# Patient Record
Sex: MALE | Race: WHITE | NOT HISPANIC OR LATINO | Employment: FULL TIME | URBAN - METROPOLITAN AREA
[De-identification: names, ages, dates, MRNs, and addresses within clinical notes are randomized per-mention and may not be internally consistent; named-entity substitution may affect disease eponyms.]

---

## 2017-02-22 ENCOUNTER — RESULTS ENCOUNTER (OUTPATIENT)
Dept: INTERNAL MEDICINE | Facility: CLINIC | Age: 52
End: 2017-02-22

## 2017-02-22 DIAGNOSIS — E78.2 ELEVATED CHOLESTEROL WITH ELEVATED TRIGLYCERIDES: ICD-10-CM

## 2018-04-06 ENCOUNTER — OFFICE VISIT (OUTPATIENT)
Dept: INTERNAL MEDICINE | Facility: CLINIC | Age: 53
End: 2018-04-06

## 2018-04-06 ENCOUNTER — HOSPITAL ENCOUNTER (OUTPATIENT)
Dept: GENERAL RADIOLOGY | Facility: HOSPITAL | Age: 53
Discharge: HOME | End: 2018-04-06
Admitting: FAMILY MEDICINE

## 2018-04-06 VITALS
DIASTOLIC BLOOD PRESSURE: 80 MMHG | TEMPERATURE: 98.3 F | SYSTOLIC BLOOD PRESSURE: 128 MMHG | HEART RATE: 66 BPM | BODY MASS INDEX: 32.7 KG/M2 | WEIGHT: 241.4 LBS | OXYGEN SATURATION: 94 % | HEIGHT: 72 IN

## 2018-04-06 DIAGNOSIS — F34.1 DYSTHYMIA: Primary | ICD-10-CM

## 2018-04-06 DIAGNOSIS — M54.2 NECK PAIN: ICD-10-CM

## 2018-04-06 DIAGNOSIS — Z23 NEED FOR TDAP VACCINATION: ICD-10-CM

## 2018-04-06 DIAGNOSIS — E66.3 OVERWEIGHT: ICD-10-CM

## 2018-04-06 LAB
CHOLEST SERPL-MCNC: 248 MG/DL (ref 0–200)
HDLC SERPL-MCNC: 42 MG/DL (ref 40–60)
LDLC SERPL CALC-MCNC: 169 MG/DL (ref 0–100)
T4 FREE SERPL-MCNC: 0.88 NG/DL (ref 0.93–1.7)
TRIGL SERPL-MCNC: 187 MG/DL (ref 0–150)
TSH SERPL DL<=0.005 MIU/L-ACNC: 2.45 MIU/ML (ref 0.27–4.2)
VLDLC SERPL CALC-MCNC: 37.4 MG/DL (ref 5–40)

## 2018-04-06 PROCEDURE — 90471 IMMUNIZATION ADMIN: CPT | Performed by: FAMILY MEDICINE

## 2018-04-06 PROCEDURE — 99214 OFFICE O/P EST MOD 30 MIN: CPT | Performed by: FAMILY MEDICINE

## 2018-04-06 PROCEDURE — 72050 X-RAY EXAM NECK SPINE 4/5VWS: CPT

## 2018-04-06 PROCEDURE — 90715 TDAP VACCINE 7 YRS/> IM: CPT | Performed by: FAMILY MEDICINE

## 2018-04-06 RX ORDER — CYCLOBENZAPRINE HCL 10 MG
10 TABLET ORAL 3 TIMES DAILY PRN
Qty: 30 TABLET | Refills: 0 | Status: SHIPPED | OUTPATIENT
Start: 2018-04-06 | End: 2018-08-14

## 2018-04-06 RX ORDER — MELOXICAM 7.5 MG/1
7.5 TABLET ORAL DAILY
Qty: 30 TABLET | Refills: 0 | Status: SHIPPED | OUTPATIENT
Start: 2018-04-06 | End: 2018-08-14

## 2018-04-06 NOTE — PROGRESS NOTES
Sanjeev Stubbs is a 52 y.o. male.      Assessment/Plan   Problem List Items Addressed This Visit        Nervous and Auditory    Neck pain    Relevant Orders    XR Spine Cervical Complete 4 or 5 View (Completed)       Other    Dysthymia - Primary    Relevant Orders    TSH (Completed)    T4, Free (Completed)    Lipid Panel (Completed)    Need for Tdap vaccination    Overweight      Other Visit Diagnoses    None.          Anti-inflammatory and muscle relaxant for neck pain follow-up results of C-spine film if symptoms persist consult physical therapy continue present management of depression with sertraline and Wellbutrin he has not followed by psychiatrist anymore at this point he is stable  Continue same      Chief Complaint   Patient presents with   • follow up to depression   • concern with weight   • pain in right side of neck     Social History   Substance Use Topics   • Smoking status: Never Smoker   • Smokeless tobacco: Never Used   • Alcohol use Yes      Comment: socially       History of Present Illness   Comes in because of follow-up for depression which is stable in some weight gain that he has developed without any specific etiology some decreased physical activity his pain in his right neck has been relatively persistent over the last couple monthsof the injury or traumatic event he feels that there is no radiating quality to the pain in there is no specific improvement with any prevention tried at home this day he is not using ice some intermittent use of anti-inflammatories his medications for depression moles status same we also discussed his preventative services requiring need for 2 to  The following portions of the patient's history were reviewed and updated as appropriate:PMHroutine: Social history , Past Medical History, Allergies, Current Medications, Active Problem List and Health Maintenance    Review of Systems   Constitutional: Negative for appetite change, fever and unexpected weight  "change.   HENT: Negative for ear pain, facial swelling, sore throat and trouble swallowing.    Eyes: Negative for pain and visual disturbance.   Respiratory: Negative for chest tightness, shortness of breath and wheezing.    Cardiovascular: Negative for chest pain and palpitations.   Gastrointestinal: Negative for abdominal pain and blood in stool.   Endocrine: Negative.    Genitourinary: Negative for decreased urine volume, difficulty urinating, frequency, hematuria and urgency.   Musculoskeletal: Positive for neck pain. Negative for joint swelling.   Neurological: Negative for tremors, seizures and syncope.   Hematological: Negative for adenopathy.   Psychiatric/Behavioral: Negative.        Objective   Vitals:    04/06/18 0932   BP: 128/80   BP Location: Left arm   Patient Position: Sitting   Cuff Size: Large Adult   Pulse: 66   Temp: 98.3 °F (36.8 °C)   TempSrc: Oral   SpO2: 94%   Weight: 109 kg (241 lb 6.4 oz)   Height: 182.9 cm (72\")     Body mass index is 32.74 kg/m².  Physical Exam   Constitutional: He is oriented to person, place, and time. He appears well-developed and well-nourished. No distress.   HENT:   Head: Normocephalic and atraumatic.   Eyes: Conjunctivae and EOM are normal. Pupils are equal, round, and reactive to light. Right eye exhibits no discharge. Left eye exhibits no discharge. No scleral icterus.   Neck: Trachea normal and normal range of motion. Neck supple. No hepatojugular reflux present. Muscular tenderness present. Carotid bruit is not present. No no neck rigidity. No tracheal deviation, no edema and no erythema present. No thyroid mass and no thyromegaly present.   Cardiovascular: Normal rate, regular rhythm, normal heart sounds, intact distal pulses and normal pulses.  Exam reveals no gallop.    No murmur heard.  Pulmonary/Chest: Effort normal and breath sounds normal. No respiratory distress. He has no wheezes. He has no rales.   Musculoskeletal: Normal range of motion.        Right " shoulder: Normal.        Right elbow: Normal.       Right wrist: Normal.   Neurological: He is alert and oriented to person, place, and time. He exhibits normal muscle tone. Coordination normal.   Skin: Skin is warm. No rash noted. No erythema. No pallor.   Psychiatric: He has a normal mood and affect. His behavior is normal. Judgment and thought content normal.   Nursing note and vitals reviewed.    Reviewed Data:  Office Visit on 04/06/2018   Component Date Value Ref Range Status   • TSH 04/06/2018 2.450  0.270 - 4.200 mIU/mL Final   • Free T4 04/06/2018 0.88* 0.93 - 1.70 ng/dL Final   • Total Cholesterol 04/06/2018 248* 0 - 200 mg/dL Final   • Triglycerides 04/06/2018 187* 0 - 150 mg/dL Final   • HDL Cholesterol 04/06/2018 42  40 - 60 mg/dL Final   • VLDL Cholesterol 04/06/2018 37.4  5 - 40 mg/dL Final   • LDL Cholesterol  04/06/2018 169* 0 - 100 mg/dL Final

## 2018-04-09 ENCOUNTER — TELEPHONE (OUTPATIENT)
Dept: INTERNAL MEDICINE | Facility: CLINIC | Age: 53
End: 2018-04-09

## 2018-04-09 RX ORDER — ATORVASTATIN CALCIUM 20 MG/1
20 TABLET, FILM COATED ORAL DAILY
Qty: 30 TABLET | Refills: 3 | Status: SHIPPED | OUTPATIENT
Start: 2018-04-09 | End: 2018-11-08 | Stop reason: SDUPTHER

## 2018-04-09 NOTE — TELEPHONE ENCOUNTER
----- Message from Harshil Garcia MD sent at 4/9/2018  7:50 AM EDT -----  Elevated Cholesterol recommend following a low-cholesterol diet start atorvastatin 20 mg daily #30 with 3 refills follow-up appointment 3 months

## 2018-04-10 RX ORDER — SERTRALINE HYDROCHLORIDE 100 MG/1
TABLET, FILM COATED ORAL
Qty: 30 TABLET | Refills: 0 | Status: SHIPPED | OUTPATIENT
Start: 2018-04-10 | End: 2018-06-27 | Stop reason: SDUPTHER

## 2018-04-10 RX ORDER — BUPROPION HYDROCHLORIDE 150 MG/1
TABLET ORAL
Qty: 30 TABLET | Refills: 0 | Status: SHIPPED | OUTPATIENT
Start: 2018-04-10 | End: 2018-06-27 | Stop reason: SDUPTHER

## 2018-04-11 ENCOUNTER — TELEPHONE (OUTPATIENT)
Dept: INTERNAL MEDICINE | Facility: CLINIC | Age: 53
End: 2018-04-11

## 2018-04-11 DIAGNOSIS — M25.78 OSTEOPHYTE OF CERVICAL SPINE: Primary | ICD-10-CM

## 2018-04-11 NOTE — TELEPHONE ENCOUNTER
----- Message from Harshil Garcia MD sent at 4/9/2018  4:37 PM EDT -----  C-spine film reveals some osteophytic changes consult physical therapy for neck pain

## 2018-06-27 RX ORDER — SERTRALINE HYDROCHLORIDE 100 MG/1
TABLET, FILM COATED ORAL
Qty: 30 TABLET | Refills: 0 | Status: SHIPPED | OUTPATIENT
Start: 2018-06-27 | End: 2018-08-14 | Stop reason: SDUPTHER

## 2018-06-27 RX ORDER — BUPROPION HYDROCHLORIDE 150 MG/1
TABLET ORAL
Qty: 30 TABLET | Refills: 0 | Status: SHIPPED | OUTPATIENT
Start: 2018-06-27 | End: 2018-08-14 | Stop reason: SDUPTHER

## 2018-08-06 RX ORDER — SERTRALINE HYDROCHLORIDE 100 MG/1
TABLET, FILM COATED ORAL
Qty: 30 TABLET | Refills: 0 | OUTPATIENT
Start: 2018-08-06

## 2018-08-06 RX ORDER — BUPROPION HYDROCHLORIDE 150 MG/1
TABLET ORAL
Qty: 30 TABLET | Refills: 0 | OUTPATIENT
Start: 2018-08-06

## 2018-08-14 ENCOUNTER — OFFICE VISIT (OUTPATIENT)
Dept: INTERNAL MEDICINE | Facility: CLINIC | Age: 53
End: 2018-08-14

## 2018-08-14 VITALS
WEIGHT: 239.7 LBS | DIASTOLIC BLOOD PRESSURE: 86 MMHG | OXYGEN SATURATION: 98 % | TEMPERATURE: 98.2 F | SYSTOLIC BLOOD PRESSURE: 148 MMHG | HEIGHT: 72 IN | HEART RATE: 75 BPM | BODY MASS INDEX: 32.47 KG/M2

## 2018-08-14 DIAGNOSIS — E66.9 NON MORBID OBESITY: ICD-10-CM

## 2018-08-14 DIAGNOSIS — F34.1 DYSTHYMIA: ICD-10-CM

## 2018-08-14 DIAGNOSIS — I10 ESSENTIAL HYPERTENSION: Primary | ICD-10-CM

## 2018-08-14 DIAGNOSIS — E78.2 MIXED HYPERLIPIDEMIA: ICD-10-CM

## 2018-08-14 PROCEDURE — 99213 OFFICE O/P EST LOW 20 MIN: CPT | Performed by: FAMILY MEDICINE

## 2018-08-14 RX ORDER — SERTRALINE HYDROCHLORIDE 100 MG/1
100 TABLET, FILM COATED ORAL DAILY
Qty: 90 TABLET | Refills: 2 | Status: SHIPPED | OUTPATIENT
Start: 2018-08-14 | End: 2019-09-11 | Stop reason: SDUPTHER

## 2018-08-14 RX ORDER — BUPROPION HYDROCHLORIDE 150 MG/1
150 TABLET ORAL EVERY MORNING
Qty: 90 TABLET | Refills: 2 | Status: SHIPPED | OUTPATIENT
Start: 2018-08-14 | End: 2019-09-11 | Stop reason: SDUPTHER

## 2018-08-14 NOTE — PROGRESS NOTES
"Sanjeev Stubbs is a 52 y.o. male.      Assessment/Plan   Problem List Items Addressed This Visit        Cardiovascular and Mediastinum    Essential hypertension - Primary    Mixed hyperlipidemia    Relevant Orders    Lipid Panel    AST    ALT       Digestive    Non morbid obesity       Other    Dysthymia    Relevant Medications    sertraline (ZOLOFT) 100 MG tablet    buPROPion XL (WELLBUTRIN XL) 150 MG 24 hr tablet             Continue Wellbutrin and sertraline follow-up results of laboratory evaluation benefit of Lipitor otherwise as needed or 9 months.  He'll have lipid panel done within the next couple weeks    Chief Complaint   Patient presents with   • follow up to depression     Social History   Substance Use Topics   • Smoking status: Never Smoker   • Smokeless tobacco: Never Used   • Alcohol use Yes      Comment: socially       History of Present Illness   Follow-up for management of chronic problems of depression doing well Wellbutrin and Zoloft like continue same he is started on atorvastatin for hyperlipidemia proximally 3 months ago and is need of monitoring liver enzymes as well as benefit of lipid reduction does not drink much alcohol he doesn't take much Tylenol is no other acute concerns  The following portions of the patient's history were reviewed and updated as appropriate:PMHroutine: Social history , Past Medical History, Allergies, Current Medications, Active Problem List, Family History and Health Maintenance    Review of Systems   Constitutional: Negative.    Musculoskeletal: Negative.    Neurological: Negative.        Objective   Vitals:    08/14/18 1521   BP: 148/86   BP Location: Left arm   Patient Position: Sitting   Cuff Size: Adult   Pulse: 75   Temp: 98.2 °F (36.8 °C)   TempSrc: Oral   SpO2: 98%   Weight: 109 kg (239 lb 11.2 oz)   Height: 182.9 cm (72\")     Body mass index is 32.51 kg/m².  Physical Exam   Constitutional: He is oriented to person, place, and time. He appears " well-developed and well-nourished. No distress.   HENT:   Head: Normocephalic and atraumatic.   Eyes: Pupils are equal, round, and reactive to light. Conjunctivae and EOM are normal. Right eye exhibits no discharge. Left eye exhibits no discharge. No scleral icterus.   Neck: Normal range of motion. Neck supple. No tracheal deviation present. No thyromegaly present.   Cardiovascular: Normal rate, regular rhythm, normal heart sounds, intact distal pulses and normal pulses.  Exam reveals no gallop.    No murmur heard.  Pulmonary/Chest: Effort normal and breath sounds normal. No respiratory distress. He has no wheezes. He has no rales.   Musculoskeletal: Normal range of motion.   Neurological: He is alert and oriented to person, place, and time. He exhibits normal muscle tone. Coordination normal.   Skin: Skin is warm. No rash noted. No erythema. No pallor.   Psychiatric: He has a normal mood and affect. His behavior is normal. Judgment and thought content normal.   Nursing note and vitals reviewed.    Reviewed Data:  No visits with results within 1 Month(s) from this visit.   Latest known visit with results is:   Office Visit on 04/06/2018   Component Date Value Ref Range Status   • TSH 04/06/2018 2.450  0.270 - 4.200 mIU/mL Final   • Free T4 04/06/2018 0.88* 0.93 - 1.70 ng/dL Final   • Total Cholesterol 04/06/2018 248* 0 - 200 mg/dL Final   • Triglycerides 04/06/2018 187* 0 - 150 mg/dL Final   • HDL Cholesterol 04/06/2018 42  40 - 60 mg/dL Final   • VLDL Cholesterol 04/06/2018 37.4  5 - 40 mg/dL Final   • LDL Cholesterol  04/06/2018 169* 0 - 100 mg/dL Final

## 2018-08-15 ENCOUNTER — RESULTS ENCOUNTER (OUTPATIENT)
Dept: INTERNAL MEDICINE | Facility: CLINIC | Age: 53
End: 2018-08-15

## 2018-08-15 DIAGNOSIS — E78.2 MIXED HYPERLIPIDEMIA: ICD-10-CM

## 2018-10-05 LAB
ALT SERPL-CCNC: 28 U/L (ref 1–41)
AST SERPL-CCNC: 22 U/L (ref 1–40)
CHOLEST SERPL-MCNC: 187 MG/DL (ref 0–200)
HDLC SERPL-MCNC: 51 MG/DL (ref 40–60)
LDLC SERPL CALC-MCNC: 115 MG/DL (ref 0–100)
TRIGL SERPL-MCNC: 106 MG/DL (ref 0–150)
VLDLC SERPL CALC-MCNC: 21.2 MG/DL (ref 5–40)

## 2018-10-08 ENCOUNTER — TELEPHONE (OUTPATIENT)
Dept: INTERNAL MEDICINE | Facility: CLINIC | Age: 53
End: 2018-10-08

## 2018-10-08 NOTE — TELEPHONE ENCOUNTER
----- Message from Harshil Garcia MD sent at 10/8/2018  8:04 AM EDT -----  Labs much improved with Lipitor, T  cholesterol 187  continue Lipitor

## 2018-11-08 RX ORDER — ATORVASTATIN CALCIUM 20 MG/1
20 TABLET, FILM COATED ORAL DAILY
Qty: 30 TABLET | Refills: 3 | Status: SHIPPED | OUTPATIENT
Start: 2018-11-08 | End: 2019-04-24 | Stop reason: SDUPTHER

## 2019-04-24 RX ORDER — ATORVASTATIN CALCIUM 20 MG/1
20 TABLET, FILM COATED ORAL DAILY
Qty: 90 TABLET | Refills: 0 | Status: SHIPPED | OUTPATIENT
Start: 2019-04-24 | End: 2019-09-11 | Stop reason: SDUPTHER

## 2019-06-26 RX ORDER — SERTRALINE HYDROCHLORIDE 100 MG/1
100 TABLET, FILM COATED ORAL DAILY
Qty: 90 TABLET | Refills: 2 | OUTPATIENT
Start: 2019-06-26

## 2019-09-03 RX ORDER — BUPROPION HYDROCHLORIDE 150 MG/1
150 TABLET ORAL EVERY MORNING
Qty: 90 TABLET | Refills: 2 | OUTPATIENT
Start: 2019-09-03

## 2019-09-06 RX ORDER — BUPROPION HYDROCHLORIDE 150 MG/1
150 TABLET ORAL EVERY MORNING
Qty: 90 TABLET | Refills: 2 | OUTPATIENT
Start: 2019-09-06

## 2019-09-06 RX ORDER — SERTRALINE HYDROCHLORIDE 100 MG/1
100 TABLET, FILM COATED ORAL DAILY
Qty: 90 TABLET | Refills: 2 | OUTPATIENT
Start: 2019-09-06

## 2019-09-06 RX ORDER — ATORVASTATIN CALCIUM 20 MG/1
20 TABLET, FILM COATED ORAL DAILY
Qty: 90 TABLET | Refills: 0 | OUTPATIENT
Start: 2019-09-06

## 2019-09-09 RX ORDER — ATORVASTATIN CALCIUM 20 MG/1
20 TABLET, FILM COATED ORAL DAILY
Qty: 90 TABLET | Refills: 0 | OUTPATIENT
Start: 2019-09-09

## 2019-09-11 ENCOUNTER — OFFICE VISIT (OUTPATIENT)
Dept: INTERNAL MEDICINE | Facility: CLINIC | Age: 54
End: 2019-09-11

## 2019-09-11 ENCOUNTER — APPOINTMENT (OUTPATIENT)
Dept: LAB | Facility: HOSPITAL | Age: 54
End: 2019-09-11

## 2019-09-11 VITALS
HEART RATE: 63 BPM | WEIGHT: 242.4 LBS | OXYGEN SATURATION: 95 % | BODY MASS INDEX: 32.88 KG/M2 | SYSTOLIC BLOOD PRESSURE: 152 MMHG | TEMPERATURE: 97.7 F | DIASTOLIC BLOOD PRESSURE: 88 MMHG

## 2019-09-11 DIAGNOSIS — E66.3 OVERWEIGHT: ICD-10-CM

## 2019-09-11 DIAGNOSIS — I10 ESSENTIAL HYPERTENSION: Primary | ICD-10-CM

## 2019-09-11 DIAGNOSIS — F34.1 DYSTHYMIA: ICD-10-CM

## 2019-09-11 DIAGNOSIS — E78.2 MIXED HYPERLIPIDEMIA: ICD-10-CM

## 2019-09-11 LAB
ALBUMIN SERPL-MCNC: 4.1 G/DL (ref 3.5–5.2)
ALBUMIN/GLOB SERPL: 1.6 G/DL
ALP SERPL-CCNC: 89 U/L (ref 39–117)
ALT SERPL W P-5'-P-CCNC: 18 U/L (ref 1–41)
ANION GAP SERPL CALCULATED.3IONS-SCNC: 8.9 MMOL/L (ref 5–15)
AST SERPL-CCNC: 16 U/L (ref 1–40)
BILIRUB SERPL-MCNC: 0.5 MG/DL (ref 0.2–1.2)
BUN BLD-MCNC: 14 MG/DL (ref 6–20)
BUN/CREAT SERPL: 15.7 (ref 7–25)
CALCIUM SPEC-SCNC: 9.4 MG/DL (ref 8.6–10.5)
CHLORIDE SERPL-SCNC: 104 MMOL/L (ref 98–107)
CHOLEST SERPL-MCNC: 210 MG/DL (ref 0–200)
CO2 SERPL-SCNC: 28.1 MMOL/L (ref 22–29)
CREAT BLD-MCNC: 0.89 MG/DL (ref 0.76–1.27)
GFR SERPL CREATININE-BSD FRML MDRD: 90 ML/MIN/1.73
GLOBULIN UR ELPH-MCNC: 2.5 GM/DL
GLUCOSE BLD-MCNC: 93 MG/DL (ref 65–99)
HDLC SERPL-MCNC: 44 MG/DL (ref 40–60)
LDLC SERPL CALC-MCNC: 145 MG/DL (ref 0–100)
LDLC/HDLC SERPL: 3.3 {RATIO}
POTASSIUM BLD-SCNC: 4.1 MMOL/L (ref 3.5–5.2)
PROT SERPL-MCNC: 6.6 G/DL (ref 6–8.5)
SODIUM BLD-SCNC: 141 MMOL/L (ref 136–145)
T4 FREE SERPL-MCNC: 0.9 NG/DL (ref 0.93–1.7)
TRIGL SERPL-MCNC: 104 MG/DL (ref 0–150)
TSH SERPL DL<=0.05 MIU/L-ACNC: 1.6 UIU/ML (ref 0.27–4.2)
VLDLC SERPL-MCNC: 20.8 MG/DL (ref 5–40)

## 2019-09-11 PROCEDURE — 84443 ASSAY THYROID STIM HORMONE: CPT | Performed by: FAMILY MEDICINE

## 2019-09-11 PROCEDURE — 80061 LIPID PANEL: CPT | Performed by: FAMILY MEDICINE

## 2019-09-11 PROCEDURE — 99214 OFFICE O/P EST MOD 30 MIN: CPT | Performed by: FAMILY MEDICINE

## 2019-09-11 PROCEDURE — 80053 COMPREHEN METABOLIC PANEL: CPT | Performed by: FAMILY MEDICINE

## 2019-09-11 PROCEDURE — 84439 ASSAY OF FREE THYROXINE: CPT | Performed by: FAMILY MEDICINE

## 2019-09-11 PROCEDURE — 36415 COLL VENOUS BLD VENIPUNCTURE: CPT | Performed by: FAMILY MEDICINE

## 2019-09-11 RX ORDER — SERTRALINE HYDROCHLORIDE 100 MG/1
100 TABLET, FILM COATED ORAL DAILY
Qty: 90 TABLET | Refills: 3 | Status: SHIPPED | OUTPATIENT
Start: 2019-09-11 | End: 2020-11-02 | Stop reason: SDUPTHER

## 2019-09-11 RX ORDER — LISINOPRIL 10 MG/1
10 TABLET ORAL DAILY
Qty: 90 TABLET | Refills: 3 | Status: SHIPPED | OUTPATIENT
Start: 2019-09-11 | End: 2020-11-02 | Stop reason: SDUPTHER

## 2019-09-11 RX ORDER — ATORVASTATIN CALCIUM 20 MG/1
20 TABLET, FILM COATED ORAL DAILY
Qty: 90 TABLET | Refills: 3 | Status: SHIPPED | OUTPATIENT
Start: 2019-09-11 | End: 2020-11-02 | Stop reason: SDUPTHER

## 2019-09-11 RX ORDER — BUPROPION HYDROCHLORIDE 150 MG/1
150 TABLET ORAL EVERY MORNING
Qty: 90 TABLET | Refills: 3 | Status: SHIPPED | OUTPATIENT
Start: 2019-09-11 | End: 2020-11-02 | Stop reason: SDUPTHER

## 2019-09-11 NOTE — PROGRESS NOTES
Sanjeev Stubbs is a 53 y.o. male.      Assessment/Plan   Problem List Items Addressed This Visit        Cardiovascular and Mediastinum    Essential hypertension - Primary    Relevant Medications    lisinopril (PRINIVIL,ZESTRIL) 10 MG tablet    Other Relevant Orders    Comprehensive Metabolic Panel    TSH    T4, Free    Mixed hyperlipidemia    Relevant Medications    atorvastatin (LIPITOR) 20 MG tablet    Other Relevant Orders    Lipid Panel       Other    Dysthymia    Relevant Medications    buPROPion XL (WELLBUTRIN XL) 150 MG 24 hr tablet    sertraline (ZOLOFT) 100 MG tablet    Overweight         Follow-up results of blood work for ongoing management of chronic problems.  Monitor blood pressure goals less than 140/90     Return in about 6 months (around 3/11/2020), or if symptoms worsen or fail to improve, for Recheck, Next scheduled follow up.      Chief Complaint   Patient presents with   • follow up to depression   • follow up to hyperlipidemia   Obesity hypertension  Social History     Tobacco Use   • Smoking status: Never Smoker   • Smokeless tobacco: Never Used   Substance Use Topics   • Alcohol use: Yes     Comment: socially   • Drug use: No       History of Present Illness   Patient appointment for follow-up of chronic problems of hypertension hyperlipidemia obesity anxiety depression is doing fairly well with present treatment plan like to continue same he has had some fluctuating thyroid function readings in the past but no symptomology related to dysregulation of thyroid.  Chest pain shortness of breath or increased fatigue no unwanted side effects of medication  The following portions of the patient's history were reviewed and updated as appropriate:PMHroutine: Social history , Allergies, Current Medications, Active Problem List and Health Maintenance    Review of Systems   Constitutional: Negative for activity change, appetite change and unexpected weight change.   HENT: Negative for congestion,  nosebleeds and trouble swallowing.    Eyes: Negative for pain and visual disturbance.   Respiratory: Negative for chest tightness, shortness of breath and wheezing.    Cardiovascular: Negative for chest pain and palpitations.   Gastrointestinal: Negative for abdominal pain and blood in stool.   Endocrine: Negative.    Genitourinary: Negative for difficulty urinating and hematuria.   Musculoskeletal: Negative for joint swelling.   Skin: Negative for color change and rash.   Allergic/Immunologic: Negative.    Neurological: Negative for syncope and speech difficulty.   Hematological: Negative for adenopathy.   Psychiatric/Behavioral: Negative for agitation and confusion.   All other systems reviewed and are negative.      Objective   Vitals:    09/11/19 0959   BP: 152/88   BP Location: Left arm   Patient Position: Sitting   Cuff Size: Large Adult   Pulse: 63   Temp: 97.7 °F (36.5 °C)   TempSrc: Oral   SpO2: 95%   Weight: 110 kg (242 lb 6.4 oz)     Body mass index is 32.88 kg/m².  Physical Exam   Constitutional: He is oriented to person, place, and time. He appears well-developed and well-nourished. No distress.   HENT:   Head: Normocephalic and atraumatic.   Right Ear: External ear normal.   Left Ear: External ear normal.   Mouth/Throat: Oropharynx is clear and moist.   Eyes: Conjunctivae and EOM are normal. Pupils are equal, round, and reactive to light. Right eye exhibits no discharge. Left eye exhibits no discharge. No scleral icterus.   Neck: Normal range of motion. Neck supple. No tracheal deviation present. No thyromegaly present.   Cardiovascular: Normal rate, regular rhythm, normal heart sounds, intact distal pulses and normal pulses. Exam reveals no gallop.   No murmur heard.  Pulmonary/Chest: Effort normal and breath sounds normal. No respiratory distress. He has no wheezes. He has no rales.   Musculoskeletal: Normal range of motion. He exhibits no edema.   Neurological: He is alert and oriented to person,  place, and time. He exhibits normal muscle tone. Coordination normal.   Skin: Skin is warm. No rash noted. No erythema. No pallor.   Psychiatric: He has a normal mood and affect. His behavior is normal. Judgment and thought content normal.   Nursing note and vitals reviewed.    Reviewed Data:  No visits with results within 1 Month(s) from this visit.   Latest known visit with results is:   Results Encounter on 08/15/2018   Component Date Value Ref Range Status   • Total Cholesterol 10/05/2018 187  0 - 200 mg/dL Final   • Triglycerides 10/05/2018 106  0 - 150 mg/dL Final   • HDL Cholesterol 10/05/2018 51  40 - 60 mg/dL Final   • VLDL Cholesterol 10/05/2018 21.2  5 - 40 mg/dL Final   • LDL Cholesterol  10/05/2018 115* 0 - 100 mg/dL Final   • AST (SGOT) 10/05/2018 22  1 - 40 U/L Final   • ALT (SGPT) 10/05/2018 28  1 - 41 U/L Final

## 2020-03-05 ENCOUNTER — OFFICE VISIT (OUTPATIENT)
Dept: INTERNAL MEDICINE | Facility: CLINIC | Age: 55
End: 2020-03-05

## 2020-03-05 VITALS
DIASTOLIC BLOOD PRESSURE: 88 MMHG | SYSTOLIC BLOOD PRESSURE: 150 MMHG | OXYGEN SATURATION: 99 % | HEIGHT: 72 IN | TEMPERATURE: 97.7 F | HEART RATE: 64 BPM | BODY MASS INDEX: 33.02 KG/M2 | WEIGHT: 243.8 LBS

## 2020-03-05 DIAGNOSIS — F34.1 DYSTHYMIA: ICD-10-CM

## 2020-03-05 DIAGNOSIS — E66.3 OVERWEIGHT: ICD-10-CM

## 2020-03-05 DIAGNOSIS — Z12.11 SCREENING FOR COLON CANCER: ICD-10-CM

## 2020-03-05 DIAGNOSIS — Z00.00 HEALTHCARE MAINTENANCE: ICD-10-CM

## 2020-03-05 DIAGNOSIS — I10 ESSENTIAL HYPERTENSION: Primary | ICD-10-CM

## 2020-03-05 DIAGNOSIS — E78.2 MIXED HYPERLIPIDEMIA: ICD-10-CM

## 2020-03-05 LAB
CHOLEST SERPL-MCNC: 204 MG/DL (ref 0–200)
HDLC SERPL-MCNC: 53 MG/DL (ref 40–60)
LDLC SERPL CALC-MCNC: 133 MG/DL (ref 0–100)
TRIGL SERPL-MCNC: 92 MG/DL (ref 0–150)
VLDLC SERPL CALC-MCNC: 18.4 MG/DL

## 2020-03-05 PROCEDURE — 99396 PREV VISIT EST AGE 40-64: CPT | Performed by: FAMILY MEDICINE

## 2020-03-05 PROCEDURE — 99214 OFFICE O/P EST MOD 30 MIN: CPT | Performed by: FAMILY MEDICINE

## 2020-03-05 NOTE — PROGRESS NOTES
Subjective   Sanjeev Stubbs is a 54 y.o. male.     Chief Complaint   Patient presents with   • follow up to hypertension   • follow up to hyperlipidemia   • follow up to anxiety   • follow up to depression         History of Present Illness   Sanjeev Stubbs 54 y.o. male who presents for an Annual Wellness Visit.  he has a history of   Patient Active Problem List   Diagnosis   • Essential hypertension   • Dysthymia   • Non morbid obesity   • Dysphagia   • Neck pain   • Need for Tdap vaccination   • Overweight   • Mixed hyperlipidemia   • Screening for colon cancer   • Healthcare maintenance   .  he has been feeling well.  Labs results discussed in detail with the patient.  Plan to update vaccines if needed today.  I  reviewed health maintenance with him as part of my preventative care plan.    Health Habits:  Dental Exam. up to date  Eye Exam. unknown  Exercise: 3 times/week.  Current exercise activities include: walking  Increase physical activity to have heart rate at 120 for least a half an hour 4 to 5 days a week    The following portions of the patient's history were reviewed and updated as appropriate: allergies, current medications, past family history, past medical history, past social history, past surgical history and problem list.    Review of Systems   Constitutional: Negative.    HENT: Negative.    Eyes: Negative.    Respiratory: Negative.    Cardiovascular: Negative.    Gastrointestinal: Negative.    Genitourinary: Negative.    Musculoskeletal: Negative.    Allergic/Immunologic: Negative.    Neurological: Negative.    Hematological: Negative.        Objective   Physical Exam   Constitutional: He is oriented to person, place, and time. He appears well-developed and well-nourished. No distress.   HENT:   Head: Normocephalic and atraumatic.   Right Ear: External ear normal.   Left Ear: External ear normal.   Mouth/Throat: Oropharynx is clear and moist.   Eyes: Pupils are equal, round, and reactive to  light. Conjunctivae and EOM are normal. Right eye exhibits no discharge. Left eye exhibits no discharge. No scleral icterus.   Neck: Normal range of motion. Neck supple. No tracheal deviation present. No thyromegaly present.   Cardiovascular: Normal rate, regular rhythm, normal heart sounds, intact distal pulses and normal pulses. Exam reveals no gallop.   No murmur heard.  Pulmonary/Chest: Effort normal and breath sounds normal. No respiratory distress. He has no wheezes. He has no rales.   Musculoskeletal: Normal range of motion. He exhibits no edema.   Neurological: He is alert and oriented to person, place, and time. He exhibits normal muscle tone. Coordination normal.   Skin: Skin is warm. No rash noted. No erythema. No pallor.   Psychiatric: He has a normal mood and affect. His behavior is normal. Judgment and thought content normal.   Nursing note and vitals reviewed.      Assessment/Plan   Sanjeev was seen today for follow up to hypertension, follow up to hyperlipidemia, follow up to anxiety and follow up to depression.    Diagnoses and all orders for this visit:    Essential hypertension    Healthcare maintenance    Mixed hyperlipidemia  -     Lipid Panel    Dysthymia    Overweight    Screening for colon cancer  -     Cologuard - Stool, Per Rectum; Future      Healthy lifestyle with diet and exercise calorie restrictions to lose weight increase physical activity monitor for signs symptoms of cardiovascular compromise with dizziness nausea chest pain shortness of breath chest pressure with activity follow-up otherwise as needed or recommended for ongoing management of chronic problems

## 2020-03-05 NOTE — PROGRESS NOTES
Sanjeev Stubbs is a 54 y.o. male.      Assessment/Plan   Problem List Items Addressed This Visit        Cardiovascular and Mediastinum    Essential hypertension - Primary    Mixed hyperlipidemia    Relevant Orders    Lipid Panel       Other    Dysthymia    Overweight    Screening for colon cancer    Relevant Orders    Cologuard - Stool, Per Rectum           Follow-up results of lipid panel for ongoing management of hyperlipidemia continue bupropion and sertraline for anxieties depression weight loss with calorie restriction diet recommended stool screening for colon cancer  Return in about 6 months (around 9/5/2020), or if symptoms worsen or fail to improve, for Recheck, Next scheduled follow up.      Chief Complaint   Patient presents with   • follow up to hypertension   • follow up to hyperlipidemia   • follow up to anxiety   • follow up to depression     Social History     Tobacco Use   • Smoking status: Never Smoker   • Smokeless tobacco: Never Used   Substance Use Topics   • Alcohol use: Yes     Comment: socially   • Drug use: No       History of Present Illness   Patient follows up for chronic problems of hypertension hyperlipidemia anxiety depression is doing fairly well with present treatment plan he like to continue the same he has no chest pain no shortness of breath no increased fatigue his mood is stabilized he is not lost much weight and is in need of recent physical activity  The following portions of the patient's history were reviewed and updated as appropriate:PMHroutine: Social history , Allergies, Current Medications, Active Problem List and Health Maintenance    Review of Systems   Constitutional: Negative for activity change, appetite change and unexpected weight change.   HENT: Negative for nosebleeds and trouble swallowing.    Eyes: Negative for pain and visual disturbance.   Respiratory: Negative for chest tightness, shortness of breath and wheezing.    Cardiovascular: Negative for chest  "pain and palpitations.   Gastrointestinal: Negative for abdominal pain and blood in stool.   Endocrine: Negative.    Genitourinary: Negative for difficulty urinating and hematuria.   Musculoskeletal: Negative for joint swelling.   Skin: Negative for color change and rash.   Allergic/Immunologic: Negative.    Neurological: Negative for syncope and speech difficulty.   Hematological: Negative for adenopathy.   Psychiatric/Behavioral: Negative for agitation and confusion.   All other systems reviewed and are negative.      Objective   Vitals:    03/05/20 0838   BP: 150/88   BP Location: Right arm   Patient Position: Sitting   Cuff Size: Large Adult   Pulse: 64   Temp: 97.7 °F (36.5 °C)   TempSrc: Oral   SpO2: 99%   Weight: 111 kg (243 lb 12.8 oz)   Height: 182.9 cm (72\")     Body mass index is 33.07 kg/m².  Physical Exam   Constitutional: He is oriented to person, place, and time. He appears well-developed and well-nourished. No distress.   HENT:   Head: Normocephalic and atraumatic.   Eyes: Pupils are equal, round, and reactive to light. Conjunctivae and EOM are normal. Right eye exhibits no discharge. Left eye exhibits no discharge. No scleral icterus.   Neck: Normal range of motion. Neck supple. No tracheal deviation present. No thyromegaly present.   Cardiovascular: Normal rate, regular rhythm, normal heart sounds, intact distal pulses and normal pulses. Exam reveals no gallop.   No murmur heard.  Pulmonary/Chest: Effort normal and breath sounds normal. No respiratory distress. He has no wheezes. He has no rales.   Musculoskeletal: Normal range of motion.   Neurological: He is alert and oriented to person, place, and time. He exhibits normal muscle tone. Coordination normal.   Skin: Skin is warm. No rash noted. No erythema. No pallor.   Psychiatric: He has a normal mood and affect. His behavior is normal. Judgment and thought content normal.   Nursing note and vitals reviewed.    Reviewed Data:  No visits with " results within 1 Month(s) from this visit.   Latest known visit with results is:   Office Visit on 09/11/2019   Component Date Value Ref Range Status   • Total Cholesterol 09/11/2019 210* 0 - 200 mg/dL Final   • Triglycerides 09/11/2019 104  0 - 150 mg/dL Final   • HDL Cholesterol 09/11/2019 44  40 - 60 mg/dL Final   • LDL Cholesterol  09/11/2019 145* 0 - 100 mg/dL Final   • VLDL Cholesterol 09/11/2019 20.8  5 - 40 mg/dL Final   • LDL/HDL Ratio 09/11/2019 3.30   Final   • Glucose 09/11/2019 93  65 - 99 mg/dL Final   • BUN 09/11/2019 14  6 - 20 mg/dL Final   • Creatinine 09/11/2019 0.89  0.76 - 1.27 mg/dL Final   • Sodium 09/11/2019 141  136 - 145 mmol/L Final   • Potassium 09/11/2019 4.1  3.5 - 5.2 mmol/L Final   • Chloride 09/11/2019 104  98 - 107 mmol/L Final   • CO2 09/11/2019 28.1  22.0 - 29.0 mmol/L Final   • Calcium 09/11/2019 9.4  8.6 - 10.5 mg/dL Final   • Total Protein 09/11/2019 6.6  6.0 - 8.5 g/dL Final   • Albumin 09/11/2019 4.10  3.50 - 5.20 g/dL Final   • ALT (SGPT) 09/11/2019 18  1 - 41 U/L Final   • AST (SGOT) 09/11/2019 16  1 - 40 U/L Final   • Alkaline Phosphatase 09/11/2019 89  39 - 117 U/L Final   • Total Bilirubin 09/11/2019 0.5  0.2 - 1.2 mg/dL Final   • eGFR Non African Amer 09/11/2019 90  >60 mL/min/1.73 Final   • Globulin 09/11/2019 2.5  gm/dL Final   • A/G Ratio 09/11/2019 1.6  g/dL Final   • BUN/Creatinine Ratio 09/11/2019 15.7  7.0 - 25.0 Final   • Anion Gap 09/11/2019 8.9  5.0 - 15.0 mmol/L Final   • TSH 09/11/2019 1.600  0.270 - 4.200 uIU/mL Final   • Free T4 09/11/2019 0.90* 0.93 - 1.70 ng/dL Final

## 2020-03-06 ENCOUNTER — RESULTS ENCOUNTER (OUTPATIENT)
Dept: INTERNAL MEDICINE | Facility: CLINIC | Age: 55
End: 2020-03-06

## 2020-03-06 DIAGNOSIS — Z12.11 SCREENING FOR COLON CANCER: ICD-10-CM

## 2020-09-17 RX ORDER — SERTRALINE HYDROCHLORIDE 100 MG/1
100 TABLET, FILM COATED ORAL DAILY
Qty: 90 TABLET | Refills: 3 | OUTPATIENT
Start: 2020-09-17

## 2020-09-17 RX ORDER — BUPROPION HYDROCHLORIDE 150 MG/1
150 TABLET ORAL EVERY MORNING
Qty: 90 TABLET | Refills: 3 | OUTPATIENT
Start: 2020-09-17

## 2020-09-17 RX ORDER — LISINOPRIL 10 MG/1
10 TABLET ORAL DAILY
Qty: 90 TABLET | Refills: 3 | OUTPATIENT
Start: 2020-09-17

## 2020-09-17 RX ORDER — ATORVASTATIN CALCIUM 20 MG/1
20 TABLET, FILM COATED ORAL DAILY
Qty: 90 TABLET | Refills: 3 | OUTPATIENT
Start: 2020-09-17

## 2020-11-02 RX ORDER — ATORVASTATIN CALCIUM 20 MG/1
20 TABLET, FILM COATED ORAL DAILY
Qty: 90 TABLET | Refills: 3 | Status: CANCELLED | OUTPATIENT
Start: 2020-11-02

## 2020-11-02 RX ORDER — SERTRALINE HYDROCHLORIDE 100 MG/1
100 TABLET, FILM COATED ORAL DAILY
Qty: 90 TABLET | Refills: 3 | Status: CANCELLED | OUTPATIENT
Start: 2020-11-02

## 2020-11-02 RX ORDER — LISINOPRIL 10 MG/1
10 TABLET ORAL DAILY
Qty: 90 TABLET | Refills: 3 | Status: CANCELLED | OUTPATIENT
Start: 2020-11-02

## 2020-11-02 RX ORDER — BUPROPION HYDROCHLORIDE 150 MG/1
150 TABLET ORAL EVERY MORNING
Qty: 90 TABLET | Refills: 3 | Status: CANCELLED | OUTPATIENT
Start: 2020-11-02

## 2020-11-04 RX ORDER — LISINOPRIL 10 MG/1
10 TABLET ORAL DAILY
Qty: 90 TABLET | Refills: 1 | Status: SHIPPED | OUTPATIENT
Start: 2020-11-04 | End: 2022-01-23 | Stop reason: SDUPTHER

## 2020-11-04 RX ORDER — SERTRALINE HYDROCHLORIDE 100 MG/1
100 TABLET, FILM COATED ORAL DAILY
Qty: 90 TABLET | Refills: 1 | Status: SHIPPED | OUTPATIENT
Start: 2020-11-04 | End: 2022-01-23 | Stop reason: SDUPTHER

## 2020-11-04 RX ORDER — ATORVASTATIN CALCIUM 20 MG/1
20 TABLET, FILM COATED ORAL DAILY
Qty: 90 TABLET | Refills: 1 | Status: SHIPPED | OUTPATIENT
Start: 2020-11-04 | End: 2022-01-23 | Stop reason: SDUPTHER

## 2020-11-04 RX ORDER — BUPROPION HYDROCHLORIDE 150 MG/1
150 TABLET ORAL EVERY MORNING
Qty: 90 TABLET | Refills: 1 | Status: SHIPPED | OUTPATIENT
Start: 2020-11-04 | End: 2022-01-23 | Stop reason: SDUPTHER

## 2022-01-24 RX ORDER — SERTRALINE HYDROCHLORIDE 100 MG/1
100 TABLET, FILM COATED ORAL DAILY
Qty: 15 TABLET | Refills: 0 | Status: SHIPPED | OUTPATIENT
Start: 2022-01-24 | End: 2022-03-08 | Stop reason: SDUPTHER

## 2022-01-24 RX ORDER — LISINOPRIL 10 MG/1
10 TABLET ORAL DAILY
Qty: 15 TABLET | Refills: 0 | Status: SHIPPED | OUTPATIENT
Start: 2022-01-24 | End: 2022-03-08 | Stop reason: SDUPTHER

## 2022-01-24 RX ORDER — ATORVASTATIN CALCIUM 20 MG/1
20 TABLET, FILM COATED ORAL DAILY
Qty: 15 TABLET | Refills: 0 | Status: SHIPPED | OUTPATIENT
Start: 2022-01-24 | End: 2022-03-08 | Stop reason: SDUPTHER

## 2022-01-24 RX ORDER — BUPROPION HYDROCHLORIDE 150 MG/1
150 TABLET ORAL EVERY MORNING
Qty: 15 TABLET | Refills: 0 | Status: SHIPPED | OUTPATIENT
Start: 2022-01-24 | End: 2022-03-08 | Stop reason: SDUPTHER

## 2022-03-03 ENCOUNTER — TELEPHONE (OUTPATIENT)
Dept: INTERNAL MEDICINE | Facility: CLINIC | Age: 57
End: 2022-03-03

## 2022-03-03 ENCOUNTER — LAB (OUTPATIENT)
Dept: LAB | Facility: HOSPITAL | Age: 57
End: 2022-03-03

## 2022-03-03 DIAGNOSIS — Z00.00 HEALTHCARE MAINTENANCE: Primary | ICD-10-CM

## 2022-03-03 LAB
25(OH)D3 SERPL-MCNC: 16.4 NG/ML (ref 30–100)
ALBUMIN SERPL-MCNC: 4.3 G/DL (ref 3.5–5.2)
ALBUMIN/GLOB SERPL: 1.8 G/DL
ALP SERPL-CCNC: 94 U/L (ref 39–117)
ALT SERPL W P-5'-P-CCNC: 18 U/L (ref 1–41)
ANION GAP SERPL CALCULATED.3IONS-SCNC: 8 MMOL/L (ref 5–15)
AST SERPL-CCNC: 18 U/L (ref 1–40)
BASOPHILS # BLD AUTO: 0.04 10*3/MM3 (ref 0–0.2)
BASOPHILS NFR BLD AUTO: 0.6 % (ref 0–1.5)
BILIRUB SERPL-MCNC: 1 MG/DL (ref 0–1.2)
BUN SERPL-MCNC: 15 MG/DL (ref 6–20)
BUN/CREAT SERPL: 17.6 (ref 7–25)
CALCIUM SPEC-SCNC: 9.1 MG/DL (ref 8.6–10.5)
CHLORIDE SERPL-SCNC: 104 MMOL/L (ref 98–107)
CHOLEST SERPL-MCNC: 230 MG/DL (ref 0–200)
CO2 SERPL-SCNC: 31 MMOL/L (ref 22–29)
CREAT SERPL-MCNC: 0.85 MG/DL (ref 0.76–1.27)
DEPRECATED RDW RBC AUTO: 42.6 FL (ref 37–54)
EGFRCR SERPLBLD CKD-EPI 2021: 103.3 ML/MIN/1.73
EOSINOPHIL # BLD AUTO: 0.37 10*3/MM3 (ref 0–0.4)
EOSINOPHIL NFR BLD AUTO: 6 % (ref 0.3–6.2)
ERYTHROCYTE [DISTWIDTH] IN BLOOD BY AUTOMATED COUNT: 13.2 % (ref 12.3–15.4)
GLOBULIN UR ELPH-MCNC: 2.4 GM/DL
GLUCOSE SERPL-MCNC: 93 MG/DL (ref 65–99)
HBA1C MFR BLD: 5.4 % (ref 4.8–5.6)
HCT VFR BLD AUTO: 44.2 % (ref 37.5–51)
HDLC SERPL-MCNC: 49 MG/DL (ref 40–60)
HGB BLD-MCNC: 15.2 G/DL (ref 13–17.7)
IMM GRANULOCYTES # BLD AUTO: 0.03 10*3/MM3 (ref 0–0.05)
IMM GRANULOCYTES NFR BLD AUTO: 0.5 % (ref 0–0.5)
LDLC SERPL CALC-MCNC: 161 MG/DL (ref 0–100)
LDLC/HDLC SERPL: 3.24 {RATIO}
LYMPHOCYTES # BLD AUTO: 1.38 10*3/MM3 (ref 0.7–3.1)
LYMPHOCYTES NFR BLD AUTO: 22.2 % (ref 19.6–45.3)
MCH RBC QN AUTO: 30.3 PG (ref 26.6–33)
MCHC RBC AUTO-ENTMCNC: 34.4 G/DL (ref 31.5–35.7)
MCV RBC AUTO: 88 FL (ref 79–97)
MONOCYTES # BLD AUTO: 0.49 10*3/MM3 (ref 0.1–0.9)
MONOCYTES NFR BLD AUTO: 7.9 % (ref 5–12)
NEUTROPHILS NFR BLD AUTO: 3.9 10*3/MM3 (ref 1.7–7)
NEUTROPHILS NFR BLD AUTO: 62.8 % (ref 42.7–76)
NRBC BLD AUTO-RTO: 0 /100 WBC (ref 0–0.2)
PLATELET # BLD AUTO: 209 10*3/MM3 (ref 140–450)
PMV BLD AUTO: 10.6 FL (ref 6–12)
POTASSIUM SERPL-SCNC: 4.1 MMOL/L (ref 3.5–5.2)
PROT SERPL-MCNC: 6.7 G/DL (ref 6–8.5)
RBC # BLD AUTO: 5.02 10*6/MM3 (ref 4.14–5.8)
SODIUM SERPL-SCNC: 143 MMOL/L (ref 136–145)
T4 FREE SERPL-MCNC: 0.92 NG/DL (ref 0.93–1.7)
TRIGL SERPL-MCNC: 111 MG/DL (ref 0–150)
TSH SERPL DL<=0.05 MIU/L-ACNC: 1.65 UIU/ML (ref 0.27–4.2)
VLDLC SERPL-MCNC: 20 MG/DL (ref 5–40)
WBC NRBC COR # BLD: 6.21 10*3/MM3 (ref 3.4–10.8)

## 2022-03-03 PROCEDURE — 80050 GENERAL HEALTH PANEL: CPT | Performed by: FAMILY MEDICINE

## 2022-03-03 PROCEDURE — 82306 VITAMIN D 25 HYDROXY: CPT | Performed by: FAMILY MEDICINE

## 2022-03-03 PROCEDURE — 36415 COLL VENOUS BLD VENIPUNCTURE: CPT | Performed by: FAMILY MEDICINE

## 2022-03-03 PROCEDURE — 83036 HEMOGLOBIN GLYCOSYLATED A1C: CPT | Performed by: FAMILY MEDICINE

## 2022-03-03 PROCEDURE — 84439 ASSAY OF FREE THYROXINE: CPT | Performed by: FAMILY MEDICINE

## 2022-03-03 PROCEDURE — 80061 LIPID PANEL: CPT | Performed by: FAMILY MEDICINE

## 2022-03-08 ENCOUNTER — OFFICE VISIT (OUTPATIENT)
Dept: INTERNAL MEDICINE | Facility: CLINIC | Age: 57
End: 2022-03-08

## 2022-03-08 VITALS
OXYGEN SATURATION: 97 % | WEIGHT: 234.9 LBS | HEIGHT: 72 IN | DIASTOLIC BLOOD PRESSURE: 84 MMHG | SYSTOLIC BLOOD PRESSURE: 148 MMHG | HEART RATE: 69 BPM | BODY MASS INDEX: 31.82 KG/M2

## 2022-03-08 DIAGNOSIS — F33.41 RECURRENT MAJOR DEPRESSIVE DISORDER, IN PARTIAL REMISSION: ICD-10-CM

## 2022-03-08 DIAGNOSIS — Z12.11 SCREENING FOR COLON CANCER: ICD-10-CM

## 2022-03-08 DIAGNOSIS — E66.3 OVERWEIGHT: ICD-10-CM

## 2022-03-08 DIAGNOSIS — I10 ESSENTIAL HYPERTENSION: ICD-10-CM

## 2022-03-08 DIAGNOSIS — E78.2 MIXED HYPERLIPIDEMIA: Primary | ICD-10-CM

## 2022-03-08 DIAGNOSIS — Z00.00 HEALTHCARE MAINTENANCE: ICD-10-CM

## 2022-03-08 PROCEDURE — 99396 PREV VISIT EST AGE 40-64: CPT | Performed by: FAMILY MEDICINE

## 2022-03-08 PROCEDURE — 99214 OFFICE O/P EST MOD 30 MIN: CPT | Performed by: FAMILY MEDICINE

## 2022-03-08 RX ORDER — LISINOPRIL 10 MG/1
10 TABLET ORAL DAILY
Qty: 90 TABLET | Refills: 3 | Status: SHIPPED | OUTPATIENT
Start: 2022-03-08 | End: 2023-03-10 | Stop reason: SDUPTHER

## 2022-03-08 RX ORDER — SERTRALINE HYDROCHLORIDE 100 MG/1
100 TABLET, FILM COATED ORAL DAILY
Qty: 90 TABLET | Refills: 3 | Status: SHIPPED | OUTPATIENT
Start: 2022-03-08 | End: 2023-03-10 | Stop reason: SDUPTHER

## 2022-03-08 RX ORDER — BUPROPION HYDROCHLORIDE 150 MG/1
150 TABLET ORAL EVERY MORNING
Qty: 90 TABLET | Refills: 3 | Status: SHIPPED | OUTPATIENT
Start: 2022-03-08 | End: 2023-03-10 | Stop reason: SDUPTHER

## 2022-03-08 RX ORDER — ATORVASTATIN CALCIUM 20 MG/1
20 TABLET, FILM COATED ORAL DAILY
Qty: 90 TABLET | Refills: 3 | Status: SHIPPED | OUTPATIENT
Start: 2022-03-08 | End: 2023-03-10 | Stop reason: SDUPTHER

## 2022-03-08 NOTE — PROGRESS NOTES
"Chief Complaint  Annual Exam, follow up to hypertension, follow up to hyperlipidemia, and trouble hearing    Subjective     {Problem List  Visit Diagnosis   Encounters  Notes  Medications  Labs  Result Review Imaging  Media :23}     Sanjeev Stubbs presents to [facility]  History of Present Illness  Patient follows up for chronic problems hypertension hyperlipidemia overweight depression he has intermittent use of his medications based on his difficulty following up with office appointment and refilling medications.  Feels mood is stable with bupropion and sertraline  He has made some adjustments in his diet to promote cardiovascular health  Objective   Vital Signs:   /84 (BP Location: Left arm, Patient Position: Sitting, Cuff Size: Large Adult)   Pulse 69   Ht 182.9 cm (72\")   Wt 107 kg (234 lb 14.4 oz)   SpO2 97%   BMI 31.86 kg/m²     Physical Exam   Result Review :     Common labs    Common Labsle 3/3/22 3/3/22 3/3/22 3/3/22    1536 1536 1536 1536   Glucose   93    BUN   15    Creatinine   0.85    Sodium   143    Potassium   4.1    Chloride   104    Calcium   9.1    Albumin   4.30    Total Bilirubin   1.0    Alkaline Phosphatase   94    AST (SGOT)   18    ALT (SGPT)   18    WBC 6.21      Hemoglobin 15.2      Hematocrit 44.2      Platelets 209      Total Cholesterol  230 (A)     Triglycerides  111     HDL Cholesterol  49     LDL Cholesterol   161 (A)     Hemoglobin A1C    5.40   (A) Abnormal value                 March 2020 total cholesterol 204      Assessment and Plan    Diagnoses and all orders for this visit:    1. Mixed hyperlipidemia (Primary)  -     atorvastatin (LIPITOR) 20 MG tablet; Take 1 tablet by mouth Daily.  Dispense: 90 tablet; Refill: 3    2. Essential hypertension  -     lisinopril (PRINIVIL,ZESTRIL) 10 MG tablet; Take 1 tablet by mouth Daily.  Dispense: 90 tablet; Refill: 3    3. Healthcare maintenance    4. Recurrent major depressive " disorder, in partial remission (HCC)  -     buPROPion XL (WELLBUTRIN XL) 150 MG 24 hr tablet; Take 1 tablet by mouth Every Morning.  Dispense: 90 tablet; Refill: 3  -     sertraline (ZOLOFT) 100 MG tablet; Take 1 tablet by mouth Daily.  Dispense: 90 tablet; Refill: 3    5. Overweight    6. Screening for colon cancer  -     Cologuard - Stool, Per Rectum; Future    Recommend monitoring blood pressure with goal less than 140/90 patient will call in 1 month update blood pressure readings  Refilled medication so he can be taking regularly for up to a year  Depression anxiety continue bupropion and sertraline follow-up otherwise as needed or    Follow Up   Return in about 1 year (around 3/8/2023), or if symptoms worsen or fail to improve, for Recheck.  Patient was given instructions and counseling regarding his condition or for health maintenance advice. Please see specific information pulled into the AVS if appropriate.

## 2022-03-08 NOTE — PROGRESS NOTES
Subjective   Sanjeev Stubbs is a 56 y.o. male.     Chief Complaint   Patient presents with   • Annual Exam   • follow up to hypertension   • follow up to hyperlipidemia   • trouble hearing     Health maintenance    History of Present Illness   Sanjeev Stubbs 56 y.o. male who presents for an Annual Wellness Visit.  he has a history of   Patient Active Problem List   Diagnosis   • Essential hypertension   • Depression   • Non morbid obesity   • Dysphagia   • Neck pain   • Need for Tdap vaccination   • Overweight   • Mixed hyperlipidemia   • Screening for colon cancer   • Healthcare maintenance   .  he has been feeling well.  Labs results discussed in detail with the patient.  Plan to update vaccines if needed today.  I  reviewed health maintenance with him as part of my preventative care plan.    Health Habits:  Dental Exam. Recommended  Eye Exam. Recommended  Exercise: 3 times/week.  Current exercise activities include: Recent use of treadmill regularly      The following portions of the patient's history were reviewed and updated as appropriate: allergies, current medications, past family history, past medical history, past social history, past surgical history and problem list.    Review of Systems   Constitutional: Negative for appetite change, fever and unexpected weight change.   HENT: Negative for ear pain, facial swelling and sore throat.    Eyes: Negative for pain and visual disturbance.   Respiratory: Negative for chest tightness, shortness of breath and wheezing.    Cardiovascular: Negative for chest pain and palpitations.   Gastrointestinal: Negative for abdominal pain and blood in stool.   Endocrine: Negative.    Genitourinary: Negative for difficulty urinating and hematuria.   Musculoskeletal: Negative for joint swelling.   Neurological: Negative for tremors, seizures and syncope.   Hematological: Negative for adenopathy.   Psychiatric/Behavioral: Negative.        Objective   Physical Exam  Vitals and  nursing note reviewed.   Constitutional:       Appearance: Normal appearance. He is well-developed. He is not diaphoretic.   HENT:      Head: Normocephalic and atraumatic.      Right Ear: Tympanic membrane, ear canal and external ear normal.      Left Ear: Tympanic membrane, ear canal and external ear normal.   Eyes:      General: Lids are normal. No scleral icterus.     Extraocular Movements: Extraocular movements intact.      Conjunctiva/sclera: Conjunctivae normal.   Neck:      Thyroid: No thyroid mass or thyromegaly.      Vascular: No carotid bruit or JVD.   Cardiovascular:      Rate and Rhythm: Normal rate and regular rhythm.      Pulses: Normal pulses.           Radial pulses are 2+ on the right side and 2+ on the left side.      Heart sounds: Normal heart sounds. No murmur heard.  Pulmonary:      Effort: Pulmonary effort is normal. No respiratory distress.      Breath sounds: Normal breath sounds.   Abdominal:      Palpations: Abdomen is soft.   Musculoskeletal:      Cervical back: Normal range of motion.      Right lower leg: No edema.      Left lower leg: No edema.   Skin:     General: Skin is warm and dry.      Coloration: Skin is not pale.      Findings: No erythema or rash.   Neurological:      General: No focal deficit present.      Mental Status: He is alert and oriented to person, place, and time.      Sensory: No sensory deficit.      Deep Tendon Reflexes: Reflexes are normal and symmetric.   Psychiatric:         Mood and Affect: Mood normal.         Behavior: Behavior normal. Behavior is cooperative.         Thought Content: Thought content normal.         Judgment: Judgment normal.         Assessment/Plan   Diagnoses and all orders for this visit:    1. Mixed hyperlipidemia (Primary)  -     atorvastatin (LIPITOR) 20 MG tablet; Take 1 tablet by mouth Daily.  Dispense: 90 tablet; Refill: 3    2. Essential hypertension  -     lisinopril (PRINIVIL,ZESTRIL) 10 MG tablet; Take 1 tablet by mouth Daily.   Dispense: 90 tablet; Refill: 3    3. Healthcare maintenance    4. Recurrent major depressive disorder, in partial remission (HCC)  -     buPROPion XL (WELLBUTRIN XL) 150 MG 24 hr tablet; Take 1 tablet by mouth Every Morning.  Dispense: 90 tablet; Refill: 3  -     sertraline (ZOLOFT) 100 MG tablet; Take 1 tablet by mouth Daily.  Dispense: 90 tablet; Refill: 3    5. Overweight    6. Screening for colon cancer  -     Cologuard - Stool, Per Rectum; Future      Continue attempts at healthy lifestyle calorie appropriate diet regular physical activity  Discussed regarding Cologuard screening patient ignored previous package sent to him  As a symptoms of prostate cancer discussed including decreased stream blood in urine blood in semen  Follow-up ongoing management of chronic medical problems which patient requests yearly as well as preventatively yearly

## 2023-03-10 ENCOUNTER — OFFICE VISIT (OUTPATIENT)
Dept: INTERNAL MEDICINE | Facility: CLINIC | Age: 58
End: 2023-03-10
Payer: COMMERCIAL

## 2023-03-10 ENCOUNTER — HOSPITAL ENCOUNTER (OUTPATIENT)
Dept: GENERAL RADIOLOGY | Facility: HOSPITAL | Age: 58
Discharge: HOME | End: 2023-03-10
Admitting: FAMILY MEDICINE
Payer: COMMERCIAL

## 2023-03-10 VITALS
SYSTOLIC BLOOD PRESSURE: 152 MMHG | DIASTOLIC BLOOD PRESSURE: 100 MMHG | BODY MASS INDEX: 30.76 KG/M2 | OXYGEN SATURATION: 98 % | WEIGHT: 227.1 LBS | HEIGHT: 72 IN | HEART RATE: 56 BPM

## 2023-03-10 DIAGNOSIS — E78.2 MIXED HYPERLIPIDEMIA: ICD-10-CM

## 2023-03-10 DIAGNOSIS — I10 ESSENTIAL HYPERTENSION: Primary | ICD-10-CM

## 2023-03-10 DIAGNOSIS — Z12.11 SCREENING FOR COLON CANCER: ICD-10-CM

## 2023-03-10 DIAGNOSIS — F33.41 RECURRENT MAJOR DEPRESSIVE DISORDER, IN PARTIAL REMISSION: ICD-10-CM

## 2023-03-10 DIAGNOSIS — Z12.5 PROSTATE CANCER SCREENING: ICD-10-CM

## 2023-03-10 DIAGNOSIS — R13.10 DYSPHAGIA, UNSPECIFIED TYPE: ICD-10-CM

## 2023-03-10 DIAGNOSIS — M79.671 PAIN OF RIGHT HEEL: ICD-10-CM

## 2023-03-10 DIAGNOSIS — Z00.00 HEALTHCARE MAINTENANCE: ICD-10-CM

## 2023-03-10 DIAGNOSIS — R06.83 SNORING: ICD-10-CM

## 2023-03-10 PROCEDURE — 99214 OFFICE O/P EST MOD 30 MIN: CPT | Performed by: FAMILY MEDICINE

## 2023-03-10 PROCEDURE — 99396 PREV VISIT EST AGE 40-64: CPT | Performed by: FAMILY MEDICINE

## 2023-03-10 PROCEDURE — 73650 X-RAY EXAM OF HEEL: CPT

## 2023-03-10 RX ORDER — OMEPRAZOLE 40 MG/1
40 CAPSULE, DELAYED RELEASE ORAL DAILY
Qty: 30 CAPSULE | Refills: 0 | Status: SHIPPED | OUTPATIENT
Start: 2023-03-10

## 2023-03-10 RX ORDER — SERTRALINE HYDROCHLORIDE 100 MG/1
100 TABLET, FILM COATED ORAL DAILY
Qty: 90 TABLET | Refills: 3 | Status: SHIPPED | OUTPATIENT
Start: 2023-03-10

## 2023-03-10 RX ORDER — ATORVASTATIN CALCIUM 20 MG/1
20 TABLET, FILM COATED ORAL DAILY
Qty: 90 TABLET | Refills: 3 | Status: SHIPPED | OUTPATIENT
Start: 2023-03-10

## 2023-03-10 RX ORDER — LISINOPRIL 20 MG/1
20 TABLET ORAL DAILY
Qty: 90 TABLET | Refills: 1 | Status: SHIPPED | OUTPATIENT
Start: 2023-03-10

## 2023-03-10 RX ORDER — BUPROPION HYDROCHLORIDE 150 MG/1
150 TABLET ORAL EVERY MORNING
Qty: 90 TABLET | Refills: 3 | Status: SHIPPED | OUTPATIENT
Start: 2023-03-10

## 2023-03-10 NOTE — PROGRESS NOTES
Subjective   Sanjeev Stubbs is a 57 y.o. male.     Chief Complaint   Patient presents with   • Annual Exam   • follow up to hypertension     Health maintenance    History of Present Illness   Sanjeev Stubbs 57 y.o. male who presents for an Annual Wellness Visit.  he has a history of   Patient Active Problem List   Diagnosis   • Essential hypertension   • Depression   • Non morbid obesity   • Dysphagia   • Neck pain   • Need for Tdap vaccination   • Overweight   • Mixed hyperlipidemia   • Screening for colon cancer   • Healthcare maintenance   • Prostate cancer screening   • Snoring   • Pain of right heel   .  he has been feeling fairly well.   I  reviewed health maintenance with him as part of my preventative care plan.  Recommend regular dental and eye exams  The following portions of the patient's history were reviewed and updated as appropriate: allergies, current medications, past family history, past medical history, past social history, past surgical history and problem list.    Review of Systems   Constitutional: Negative.    HENT: Positive for trouble swallowing.    Eyes: Negative.    Respiratory: Negative.         Snoring and sleep apnea potential   Cardiovascular: Negative.    Gastrointestinal: Negative.    Endocrine: Negative.    Genitourinary: Negative.    Musculoskeletal:        Right heel pain   Allergic/Immunologic: Negative.    Neurological: Negative.    Hematological: Negative.    Psychiatric/Behavioral: Negative.        Objective   Physical Exam  Vitals and nursing note reviewed.   Constitutional:       Appearance: Normal appearance. He is well-developed. He is not diaphoretic.   HENT:      Head: Normocephalic and atraumatic.      Right Ear: Tympanic membrane, ear canal and external ear normal.      Left Ear: Tympanic membrane, ear canal and external ear normal.      Mouth/Throat:      Mouth: Mucous membranes are moist.      Pharynx: No posterior oropharyngeal erythema.   Eyes:      General: Lids  are normal. No scleral icterus.     Extraocular Movements: Extraocular movements intact.      Conjunctiva/sclera: Conjunctivae normal.   Neck:      Thyroid: No thyroid mass or thyromegaly.      Vascular: No carotid bruit or JVD.   Cardiovascular:      Rate and Rhythm: Normal rate and regular rhythm.      Pulses: Normal pulses.           Radial pulses are 2+ on the right side and 2+ on the left side.      Heart sounds: Normal heart sounds. No murmur heard.  Pulmonary:      Effort: Pulmonary effort is normal. No respiratory distress.      Breath sounds: Normal breath sounds.   Abdominal:      Palpations: Abdomen is soft.      Tenderness: There is no right CVA tenderness.   Musculoskeletal:         General: Tenderness present.      Cervical back: Normal range of motion.      Right lower leg: No edema.      Left lower leg: No edema.      Comments: Right heel   Skin:     General: Skin is warm and dry.      Coloration: Skin is not pale.      Findings: No erythema or rash.   Neurological:      General: No focal deficit present.      Mental Status: He is alert and oriented to person, place, and time.      Sensory: No sensory deficit.      Deep Tendon Reflexes: Reflexes are normal and symmetric.   Psychiatric:         Mood and Affect: Mood normal.         Behavior: Behavior normal. Behavior is cooperative.         Thought Content: Thought content normal.         Judgment: Judgment normal.         Assessment & Plan   Diagnoses and all orders for this visit:      1. Healthcare maintenance      2. Prostate cancer screening  -     PSA Screen        3. Screening for colon cancer  -     Ambulatory Referral For Screening Colonoscopy  -     Ambulatory Referral to Gastroenterology    Continue attempts at healthy lifestyle calorie appropriate diet and regular physical activity  Education provided regarding screening for cancers and have been ordered  Education provided regarding prevention of serious illness with  immunizations  Follow-up ongoing management of chronic problems acute problems otherwise as needed preventatively annually

## 2023-03-10 NOTE — PROGRESS NOTES
"Chief Complaint  Annual Exam and follow up to hypertension    Subjective        Sanjeev Stubbs presents to [facility]  History of Present Illness  Patient follows up for ongoing management of hypertension presently taking lisinopril 10 mg daily has had elevated readings greater than 140/90 .  No chest pain shortness of breath or increased fatigue  He also has heel pain 6 months located on the plantar surface right foot  Not having specific trauma but has to adjust his gait especially after sitting for a while or getting out of bed in the morning has become more bothersome for him no anti-inflammatories taken  Depression is stable with bupropion and sertraline does not want side effects of statin therapy.  He complains of feeling that food is getting caught in his throat intermittent points.  The sternal notch he does not have any vomiting does not coughing does not have any heartburn been going on for quite some time with things is gradually getting worse and his wife is encouraging him to have it looked at.  He does have significant snoring but does not know if there is any associated sleep apnea he does not have any daytime fatigue  Objective   Vital Signs:  /100 (BP Location: Right arm, Patient Position: Sitting, Cuff Size: Adult)   Pulse 56   Ht 182.9 cm (72\")   Wt 103 kg (227 lb 1.6 oz)   SpO2 98%   BMI 30.80 kg/m²   Estimated body mass index is 30.8 kg/m² as calculated from the following:    Height as of this encounter: 182.9 cm (72\").    Weight as of this encounter: 103 kg (227 lb 1.6 oz).             Physical Exam  Vitals and nursing note reviewed.   Constitutional:       Appearance: Normal appearance. He is well-developed. He is not diaphoretic.   HENT:      Head: Normocephalic and atraumatic.      Right Ear: Tympanic membrane, ear canal and external ear normal.      Left Ear: Tympanic membrane, ear canal and external ear normal.      Nose: No congestion.      " Mouth/Throat:      Mouth: Mucous membranes are moist.      Pharynx: No posterior oropharyngeal erythema.      Comments: Mallampati class Ill  Eyes:      General: Lids are normal. No scleral icterus.     Extraocular Movements: Extraocular movements intact.      Conjunctiva/sclera: Conjunctivae normal.   Neck:      Thyroid: No thyroid mass or thyromegaly.      Vascular: No carotid bruit or JVD.   Cardiovascular:      Rate and Rhythm: Normal rate and regular rhythm.      Pulses: Normal pulses.           Radial pulses are 2+ on the right side and 2+ on the left side.      Heart sounds: Normal heart sounds. No murmur heard.  Pulmonary:      Effort: Pulmonary effort is normal. No respiratory distress.      Breath sounds: Normal breath sounds.   Abdominal:      Palpations: Abdomen is soft.      Tenderness: There is no right CVA tenderness or left CVA tenderness.   Musculoskeletal:      Cervical back: Normal range of motion.      Right lower leg: No edema.      Left lower leg: No edema.        Feet:    Skin:     General: Skin is warm and dry.      Coloration: Skin is not pale.      Findings: No erythema or rash.   Neurological:      General: No focal deficit present.      Mental Status: He is alert and oriented to person, place, and time.      Sensory: No sensory deficit.      Deep Tendon Reflexes: Reflexes are normal and symmetric.   Psychiatric:         Mood and Affect: Mood normal.         Behavior: Behavior normal. Behavior is cooperative.         Thought Content: Thought content normal.         Judgment: Judgment normal.        Result Review :                     Assessment and Plan   Diagnoses and all orders for this visit:    1. Essential hypertension (Primary)  -     Comprehensive Metabolic Panel  -     lisinopril (PRINIVIL,ZESTRIL) 20 MG tablet; Take 1 tablet by mouth Daily.  Dispense: 90 tablet; Refill: 1    2. Mixed hyperlipidemia  -     Lipid Panel  -     atorvastatin (LIPITOR) 20 MG tablet; Take 1 tablet by  mouth Daily.  Dispense: 90 tablet; Refill: 3        4. Recurrent major depressive disorder, in partial remission (HCC)  -     sertraline (ZOLOFT) 100 MG tablet; Take 1 tablet by mouth Daily.  Dispense: 90 tablet; Refill: 3  -     buPROPion XL (WELLBUTRIN XL) 150 MG 24 hr tablet; Take 1 tablet by mouth Every Morning.  Dispense: 90 tablet; Refill: 3    5. Dysphagia, unspecified type  -     omeprazole (priLOSEC) 40 MG capsule; Take 1 capsule by mouth Daily.  Dispense: 30 capsule; Refill: 0  -     Ambulatory Referral to ENT (Otolaryngology)    6. Snoring  -     Ambulatory Referral to ENT (Otolaryngology)    7. Prostate cancer screening  -     PSA Screen    8. Pain of right heel  -     XR calcaneUS 2+ vw right  -     Ambulatory Referral to Podiatry    9. Screening for colon cancer  -     Ambulatory Referral For Screening Colonoscopy  -     Ambulatory Referral to Gastroenterology    Follow-up results of testing and referrals  Initiate therapy for GERD contributing to dysphagia         Follow Up   Return in about 3 months (around 6/10/2023), or if symptoms worsen or fail to improve, for Recheck.  Patient was given instructions and counseling regarding his condition or for health maintenance advice. Please see specific information pulled into the AVS if appropriate.

## 2023-03-11 LAB
ALBUMIN SERPL-MCNC: 4.3 G/DL (ref 3.8–4.9)
ALBUMIN/GLOB SERPL: 2.2 {RATIO} (ref 1.2–2.2)
ALP SERPL-CCNC: 95 IU/L (ref 44–121)
ALT SERPL-CCNC: 17 IU/L (ref 0–44)
AST SERPL-CCNC: 18 IU/L (ref 0–40)
BILIRUB SERPL-MCNC: 1 MG/DL (ref 0–1.2)
BUN SERPL-MCNC: 20 MG/DL (ref 6–24)
BUN/CREAT SERPL: 21 (ref 9–20)
CALCIUM SERPL-MCNC: 9.3 MG/DL (ref 8.7–10.2)
CHLORIDE SERPL-SCNC: 104 MMOL/L (ref 96–106)
CHOLEST SERPL-MCNC: 183 MG/DL (ref 100–199)
CO2 SERPL-SCNC: 28 MMOL/L (ref 20–29)
CREAT SERPL-MCNC: 0.97 MG/DL (ref 0.76–1.27)
EGFRCR SERPLBLD CKD-EPI 2021: 92 ML/MIN/1.73
GLOBULIN SER CALC-MCNC: 2 G/DL (ref 1.5–4.5)
GLUCOSE SERPL-MCNC: 94 MG/DL (ref 70–99)
HDLC SERPL-MCNC: 51 MG/DL
LDLC SERPL CALC-MCNC: 118 MG/DL (ref 0–99)
POTASSIUM SERPL-SCNC: 4.5 MMOL/L (ref 3.5–5.2)
PROT SERPL-MCNC: 6.3 G/DL (ref 6–8.5)
PSA SERPL-MCNC: 0.5 NG/ML (ref 0–4)
SODIUM SERPL-SCNC: 144 MMOL/L (ref 134–144)
TRIGL SERPL-MCNC: 77 MG/DL (ref 0–149)
VLDLC SERPL CALC-MCNC: 14 MG/DL (ref 5–40)

## 2023-03-13 DIAGNOSIS — M77.31 HEEL SPUR, RIGHT: Primary | ICD-10-CM

## 2023-03-20 ENCOUNTER — PREP FOR SURGERY (OUTPATIENT)
Dept: SURGERY | Facility: SURGERY CENTER | Age: 58
End: 2023-03-20
Payer: COMMERCIAL

## 2023-03-20 DIAGNOSIS — Z12.11 ENCOUNTER FOR SCREENING FOR MALIGNANT NEOPLASM OF COLON: Primary | ICD-10-CM

## 2023-03-21 RX ORDER — SODIUM CHLORIDE, SODIUM LACTATE, POTASSIUM CHLORIDE, CALCIUM CHLORIDE 600; 310; 30; 20 MG/100ML; MG/100ML; MG/100ML; MG/100ML
30 INJECTION, SOLUTION INTRAVENOUS CONTINUOUS PRN
OUTPATIENT
Start: 2023-03-21

## 2023-04-19 ENCOUNTER — HOSPITAL ENCOUNTER (OUTPATIENT)
Facility: SURGERY CENTER | Age: 58
Setting detail: HOSPITAL OUTPATIENT SURGERY
Discharge: HOME | End: 2023-04-19
Attending: INTERNAL MEDICINE | Admitting: INTERNAL MEDICINE
Payer: COMMERCIAL

## 2023-04-19 ENCOUNTER — ANESTHESIA (OUTPATIENT)
Dept: SURGERY | Facility: SURGERY CENTER | Age: 58
End: 2023-04-19
Payer: COMMERCIAL

## 2023-04-19 ENCOUNTER — ANESTHESIA EVENT (OUTPATIENT)
Dept: SURGERY | Facility: SURGERY CENTER | Age: 58
End: 2023-04-19
Payer: COMMERCIAL

## 2023-04-19 VITALS
DIASTOLIC BLOOD PRESSURE: 79 MMHG | WEIGHT: 219 LBS | BODY MASS INDEX: 29.66 KG/M2 | OXYGEN SATURATION: 98 % | SYSTOLIC BLOOD PRESSURE: 121 MMHG | RESPIRATION RATE: 16 BRPM | HEART RATE: 62 BPM | TEMPERATURE: 98.4 F | HEIGHT: 72 IN

## 2023-04-19 DIAGNOSIS — Z12.11 ENCOUNTER FOR SCREENING FOR MALIGNANT NEOPLASM OF COLON: ICD-10-CM

## 2023-04-19 PROCEDURE — 45380 COLONOSCOPY AND BIOPSY: CPT | Performed by: INTERNAL MEDICINE

## 2023-04-19 PROCEDURE — 45385 COLONOSCOPY W/LESION REMOVAL: CPT | Performed by: INTERNAL MEDICINE

## 2023-04-19 PROCEDURE — 25010000002 PROPOFOL 10 MG/ML EMULSION: Performed by: ANESTHESIOLOGY

## 2023-04-19 PROCEDURE — 88305 TISSUE EXAM BY PATHOLOGIST: CPT | Performed by: INTERNAL MEDICINE

## 2023-04-19 DEVICE — REPLAY HEMOSTASIS CLIP, 16MM SPAN
Type: IMPLANTABLE DEVICE | Site: COLON | Status: FUNCTIONAL
Brand: REPLAY

## 2023-04-19 RX ORDER — PROPOFOL 10 MG/ML
VIAL (ML) INTRAVENOUS AS NEEDED
Status: DISCONTINUED | OUTPATIENT
Start: 2023-04-19 | End: 2023-04-19 | Stop reason: SURG

## 2023-04-19 RX ORDER — SODIUM CHLORIDE, SODIUM LACTATE, POTASSIUM CHLORIDE, CALCIUM CHLORIDE 600; 310; 30; 20 MG/100ML; MG/100ML; MG/100ML; MG/100ML
30 INJECTION, SOLUTION INTRAVENOUS CONTINUOUS PRN
Status: DISCONTINUED | OUTPATIENT
Start: 2023-04-19 | End: 2023-04-19 | Stop reason: HOSPADM

## 2023-04-19 RX ORDER — SODIUM CHLORIDE 0.9 % (FLUSH) 0.9 %
10 SYRINGE (ML) INJECTION AS NEEDED
Status: DISCONTINUED | OUTPATIENT
Start: 2023-04-19 | End: 2023-04-19 | Stop reason: HOSPADM

## 2023-04-19 RX ORDER — MAGNESIUM HYDROXIDE 1200 MG/15ML
LIQUID ORAL AS NEEDED
Status: DISCONTINUED | OUTPATIENT
Start: 2023-04-19 | End: 2023-04-19 | Stop reason: HOSPADM

## 2023-04-19 RX ORDER — SODIUM CHLORIDE 0.9 % (FLUSH) 0.9 %
3 SYRINGE (ML) INJECTION EVERY 12 HOURS SCHEDULED
Status: DISCONTINUED | OUTPATIENT
Start: 2023-04-19 | End: 2023-04-19 | Stop reason: HOSPADM

## 2023-04-19 RX ADMIN — PROPOFOL 160 MCG/KG/MIN: 10 INJECTION, EMULSION INTRAVENOUS at 12:33

## 2023-04-19 RX ADMIN — PROPOFOL 120 MG: 10 INJECTION, EMULSION INTRAVENOUS at 12:33

## 2023-04-19 RX ADMIN — SODIUM CHLORIDE, SODIUM LACTATE, POTASSIUM CHLORIDE, AND CALCIUM CHLORIDE 30 ML/HR: .6; .31; .03; .02 INJECTION, SOLUTION INTRAVENOUS at 12:08

## 2023-04-19 NOTE — H&P
No chief complaint on file.      HPI  Patient today for screening colonoscopy.         Problem List:    Patient Active Problem List   Diagnosis   • Essential hypertension   • Depression   • Non morbid obesity   • Dysphagia   • Neck pain   • Need for Tdap vaccination   • Overweight   • Mixed hyperlipidemia   • Screening for colon cancer   • Healthcare maintenance   • Prostate cancer screening   • Snoring   • Pain of right heel       Medical History:    Past Medical History:   Diagnosis Date   • Depression    • Hypertension         Social History:    Social History     Socioeconomic History   • Marital status:    • Number of children: 2   Tobacco Use   • Smoking status: Never   • Smokeless tobacco: Never   Vaping Use   • Vaping Use: Never used   Substance and Sexual Activity   • Alcohol use: Yes     Comment: socially   • Drug use: No   • Sexual activity: Defer       Family History:   Family History   Problem Relation Age of Onset   • Migraines Father        Surgical History:   Past Surgical History:   Procedure Laterality Date   • ANKLE SURGERY Left    • COLONOSCOPY           Current Facility-Administered Medications:   •  lactated ringers infusion, 30 mL/hr, Intravenous, Continuous PRN, Dax Colon MD    Allergies: No Known Allergies     The following portions of the patient's history were reviewed by me and updated as appropriate: review of systems, allergies, current medications, past family history, past medical history, past social history, past surgical history and problem list.    There were no vitals filed for this visit.    PHYSICAL EXAM:    CONSTITUTIONAL:  today's vital signs reviewed by me  GASTROINTESTINAL: abdomen is soft nontender nondistended with normal active bowel sounds, no masses are appreciated    Assessment/ Plan  We will proceed today with screening colonoscopy.    Risks and benefits as well as alternatives to endoscopic evaluation were explained to the patient and they voiced  understanding and wish to proceed.  These risks include but are not limited to the risk of bleeding, perforation, adverse reaction to sedation, and missed lesions.  The patient was given the opportunity to ask questions prior to the endoscopic procedure.

## 2023-04-19 NOTE — ANESTHESIA POSTPROCEDURE EVALUATION
Patient: Sanjeev Stubbs    Procedure Summary     Date: 04/19/23 Room / Location: [facility] / [facility]    Anesthesia Start: 1228 Anesthesia Stop: 1303    Procedure: COLONOSCOPY FOR SCREENING Diagnosis:       Encounter for screening for malignant neoplasm of colon      (Encounter for screening for malignant neoplasm of colon [Z12.11])    Surgeons: Dax Colon MD Provider: Obdulia Dietz MD    Anesthesia Type: MAC ASA Status: 2          Anesthesia Type: MAC    Vitals  Vitals Value Taken Time   /79 04/19/23 1313   Temp 36.9 °C (98.4 °F) 04/19/23 1303   Pulse 62 04/19/23 1313   Resp 16 04/19/23 1313   SpO2 98 % 04/19/23 1313           Post Anesthesia Care and Evaluation    Level of consciousness: awake  Pain management: satisfactory to patient    Airway patency: patent  Anesthetic complications: No anesthetic complications  PONV Status: controlled  Cardiovascular status: acceptable  Respiratory status: acceptable  Hydration status: acceptable

## 2023-04-19 NOTE — ANESTHESIA PREPROCEDURE EVALUATION
Anesthesia Evaluation     Patient summary reviewed and Nursing notes reviewed   NPO Solid Status: > 6 hours  NPO Liquid Status: > 6 hours           Airway   Mallampati: II  TM distance: >3 FB  Neck ROM: full  Dental - normal exam     Pulmonary    (-) COPD, asthma, sleep apnea, not a smoker  Cardiovascular     (+) hypertension, hyperlipidemia,   (-) CAD, dysrhythmias, angina      Neuro/Psych  (+) psychiatric history Depression,    (-) seizures, CVA  GI/Hepatic/Renal/Endo    (+) obesity,  GERD,    (-) diabetes    Musculoskeletal     (+) neck pain,   Abdominal    Substance History      OB/GYN          Other                        Anesthesia Plan    ASA 2     MAC       Anesthetic plan, risks, benefits, and alternatives have been provided, discussed and informed consent has been obtained with: patient.        CODE STATUS:

## 2023-04-20 LAB
LAB AP CASE REPORT: NORMAL
LAB AP DIAGNOSIS COMMENT: NORMAL
PATH REPORT.FINAL DX SPEC: NORMAL
PATH REPORT.GROSS SPEC: NORMAL

## 2023-04-28 ENCOUNTER — TRANSCRIBE ORDERS (OUTPATIENT)
Dept: ADMINISTRATIVE | Facility: HOSPITAL | Age: 58
End: 2023-04-28
Payer: COMMERCIAL

## 2023-04-28 DIAGNOSIS — R13.14 DYSPHAGIA, PHARYNGOESOPHAGEAL: Primary | ICD-10-CM

## 2023-05-05 DIAGNOSIS — R13.10 DYSPHAGIA, UNSPECIFIED TYPE: ICD-10-CM

## 2023-05-05 RX ORDER — OMEPRAZOLE 40 MG/1
40 CAPSULE, DELAYED RELEASE ORAL DAILY
Qty: 30 CAPSULE | Refills: 0 | Status: CANCELLED | OUTPATIENT
Start: 2023-05-05

## 2023-05-09 RX ORDER — OMEPRAZOLE 40 MG/1
40 CAPSULE, DELAYED RELEASE ORAL DAILY
Qty: 30 CAPSULE | Refills: 0 | Status: SHIPPED | OUTPATIENT
Start: 2023-05-09

## 2023-05-16 ENCOUNTER — HOSPITAL ENCOUNTER (OUTPATIENT)
Dept: GENERAL RADIOLOGY | Facility: HOSPITAL | Age: 58
Discharge: HOME | End: 2023-05-16
Admitting: OTOLARYNGOLOGY
Payer: COMMERCIAL

## 2023-05-16 DIAGNOSIS — R13.14 DYSPHAGIA, PHARYNGOESOPHAGEAL: ICD-10-CM

## 2023-05-16 PROCEDURE — 92611 MOTION FLUOROSCOPY/SWALLOW: CPT

## 2023-05-16 PROCEDURE — 74230 X-RAY XM SWLNG FUNCJ C+: CPT

## 2023-05-16 RX ADMIN — BARIUM SULFATE 50 ML: 400 SUSPENSION ORAL at 13:23

## 2023-05-16 RX ADMIN — BARIUM SULFATE 55 ML: 0.81 POWDER, FOR SUSPENSION ORAL at 13:23

## 2023-05-16 RX ADMIN — BARIUM SULFATE 1 TEASPOON(S): 0.6 CREAM ORAL at 13:23

## 2023-05-16 RX ADMIN — BARIUM SULFATE 4 ML: 980 POWDER, FOR SUSPENSION ORAL at 13:23

## 2023-05-16 NOTE — MBS/VFSS/FEES
Outpatient Speech Language Pathology   Adult Swallow Initial Evaluation  [facility]     Patient Name: Sanjeev Stubbs  : 1965  MRN: [id number]  Today's Date: 2023         Visit Date: 2023   Patient Active Problem List   Diagnosis   • Essential hypertension   • Depression   • Non morbid obesity   • Dysphagia   • Neck pain   • Need for Tdap vaccination   • Overweight   • Mixed hyperlipidemia   • Screening for colon cancer   • Healthcare maintenance   • Prostate cancer screening   • Snoring   • Pain of right heel        Past Medical History:   Diagnosis Date   • Depression    • Hypertension         Past Surgical History:   Procedure Laterality Date   • ANKLE SURGERY Left    • COLONOSCOPY     • COLONOSCOPY N/A 2023    Procedure: COLONOSCOPY FOR SCREENING;  Surgeon: Dax Colon MD;  [place] [facility] [place];  Service: Gastroenterology;  Laterality: N/A;  polyps         Visit Dx:     ICD-10-CM ICD-9-CM   1. Dysphagia, pharyngoesophageal  R13.14 787.24            OP SLP Assessment/Plan - 23 1516        SLP Assessment    Functional Problems Swallowing  -CR    Clinical Impression: Swallowing WNL  -CR    SLP Diagnosis Functional oropharyngeal swallow  -CR    Patient would benefit from skilled therapy intervention No  -CR          User Key  (r) = Recorded By, (t) = Taken By, (c) = Cosigned By    Initials Name Provider Type    CR Arielle Fregoso CF-SLP Speech and Language Pathologist                 SLP Adult Swallow Evaluation     Row Name 23 1400       Rehab Evaluation    Document Type evaluation  -CR    Subjective Information no complaints  -CR    Patient Observations alert;cooperative  -CR    Patient Effort excellent  -CR    Symptoms Noted During/After Treatment none  -CR       General Information    Patient Profile Reviewed yes  -CR    Pertinent History Of Current Problem 56 y/o male with complaints of feeling solids stuck in throat causing him to take a drink which then  results in regurgitation.  -CR    Current Method of Nutrition regular textures;thin liquids  -CR    Precautions/Limitations, Vision WFL;for purposes of eval  -CR    Precautions/Limitations, Hearing WFL;for purposes of eval  -CR    Prior Level of Function-Communication WFL  -CR    Prior Level of Function-Swallowing no diet consistency restrictions  -CR    Plans/Goals Discussed with patient;agreed upon  -CR    Barriers to Rehab none identified  -CR       Pain    Additional Documentation Pain Scale: Numbers Pre/Post-Treatment (Group)  -CR       Pain Scale: Numbers Pre/Post-Treatment    Pretreatment Pain Rating 0/10 - no pain  -CR    Posttreatment Pain Rating 0/10 - no pain  -CR       Oral Motor Structure and Function    Dentition Assessment natural, present and adequate  -CR    Secretion Management WNL/WFL  -CR    Mucosal Quality moist, healthy  -CR       Oral Musculature and Cranial Nerve Assessment    Oral Motor General Assessment WFL  -CR       MBS/VFSS Interpretation    VFSS Summary VFSS complete. Radiologist, Dr. Irvin, present during the study. Patient presents with functional and age-appropriate oropharyngeal swallow. No penetration/aspiration present during the study. Trace-mild base of tongue and pharyngeal residue present throughout PO trials, however, patient reduced residue with cued double swallows or thin liquid wash. Patient elicited a swallow at the level of the pyriforms with thin liquid by cup and straw. Swallow triggered at the valleculae with puree, soft/chopped solids, mixed consistency, and regular solids. Esophageal scan with nectar thick liquid completed which cleared swiftly from the esophagus. Recommend patient continue regular solid and thin liquid diet. Reviewed safe swallow strategies and reflux precautions with patient if complaints continue.  -CR       SLP Communication to Radiology    Summary Statement VFSS complete. Radiologist, Dr. Irvin, present during the study. Patient presents  with functional oropharyngeal swallow. No penetration/aspiration visualized on the study. Esophageal scan with nectar thick liquid revealed elizondo clearance.  -CR       SLP Evaluation Clinical Impression    SLP Swallowing Diagnosis swallow WFL/no suspected pharyngeal impairment  -CR    Functional Impact no impact on function  -CR    Swallow Criteria for Skilled Therapeutic Interventions Met no problems identified which require skilled intervention  -CR       Recommendations    Therapy Frequency (Swallow) evaluation only  -CR    SLP Diet Recommendation regular textures;thin liquids  -CR    Recommended Diagnostics No further SLP services recommended  -CR    Recommended Precautions and Strategies upright posture during/after eating;small bites of food and sips of liquid;general aspiration precautions;other (see comments)  slow rate  -CR    Oral Care Recommendations Oral Care BID/PRN  -CR    SLP Rec. for Method of Medication Administration meds whole;with thin liquids;as tolerated  -CR    Monitor for Signs of Aspiration yes;notify SLP if any concerns  -CR          User Key  (r) = Recorded By, (t) = Taken By, (c) = Cosigned By    Initials Name Provider Type    Arielle Muse CF-SLP Speech and Language Pathologist                               OP SLP Education     Row Name 05/16/23 3460       Education    Barriers to Learning No barriers identified  -CR    Education Provided Described results of evaluation;Patient expressed understanding of evaluation  -CR    Assessed Learning needs;Learning motivation;Learning preferences;Learning readiness  -CR    Learning Motivation Strong  -CR    Learning Method Explanation  -CR    Teaching Response Verbalized understanding  -CR          User Key  (r) = Recorded By, (t) = Taken By, (c) = Cosigned By    Initials Name Effective Dates    Arielle Muse CF-SLP 11/10/22 -                          Time Calculation:   SLP Start Time: 1300    Therapy Charges for Today     Code  Description Service Date Service Provider Modifiers Qty    86556894706 HC ST MOTION FLUORO EVAL SWALLOW 4 5/16/2023 Arielle Fregoso, CF-SLP GN 1                   ALMA ROSA You  5/16/2023

## 2023-05-30 NOTE — PROGRESS NOTES
"Chief Complaint   Patient presents with   • Difficulty Swallowing         History of Present Illness  57-year-old male presents the office today for follow-up.  He underwent colonoscopy on 4/19/2023 revealing a total of 6 colon polyps and nonbleeding internal hemorrhoids.  Ascending colon biopsy revealed fragments of sessile serrated lesion and tubular adenoma.  Next colonoscopy recommended in 3 years.    He has a history of GERD and takes omeprazole 40 mg once daily. He reports that he experiences dysphagia a couple of times every week. Typically pastas, breads, and chicken are the problematic foods.  When he would consume liquids to try to aid in swallowing, symptoms would worsen and he would regurgitate.  He reports that he eats slowly. He regurgitates frequently which can progress to full emesis. He reports having an EGD several years ago. He has never had an esophageal dilation.     Review of Systems   Constitutional: Negative for fever and unexpected weight change.   HENT: Positive for trouble swallowing.    Cardiovascular: Negative for chest pain.   Gastrointestinal: Negative for abdominal distention, abdominal pain, anal bleeding, blood in stool, constipation, diarrhea, nausea, rectal pain and vomiting.         Result Review :       COLONOSCOPY (04/19/2023 12:22)  Tissue Pathology Exam (04/19/2023 12:41)  Comprehensive Metabolic Panel (03/10/2023 13:22)  FL Video Swallow With Speech Single Contrast (05/16/2023 13:23)  MBS/VFSS/FEES by Arielle Fregoso CF-SLP (05/16/2023 13:15)    Vital Signs:   /100   Pulse 81   Temp 97.7 °F (36.5 °C)   Ht 182.9 cm (72.01\")   Wt 104 kg (229 lb)   SpO2 97%   BMI 31.05 kg/m²     Body mass index is 31.05 kg/m².     Physical Exam  Vitals reviewed.   Constitutional:       Appearance: Normal appearance.   Pulmonary:      Effort: Pulmonary effort is normal. No respiratory distress.   Abdominal:      General: Abdomen is flat. Bowel sounds are normal. There is no " distension.      Palpations: Abdomen is soft. There is no mass.      Tenderness: There is no abdominal tenderness. There is no guarding.   Musculoskeletal:         General: Normal range of motion.   Skin:     General: Skin is warm.   Neurological:      General: No focal deficit present.      Mental Status: He is alert and oriented to person, place, and time.   Psychiatric:         Mood and Affect: Mood normal.         Behavior: Behavior normal.         Thought Content: Thought content normal.         Judgment: Judgment normal.       Assessment and Plan    Diagnoses and all orders for this visit:    1. Gastroesophageal reflux disease, unspecified whether esophagitis present (Primary)    2. Dysphagia, unspecified type    3. Regurgitation of food    4. Hx of adenomatous colonic polyps    5. Colon cancer screening           Patient Instructions   1.  For GERD, continue omeprazole 40 mg once daily.    2.  For further evaluation of dysphagia we will proceed with EGD with possible esophageal dilation.    3.  Next colonoscopy due to 3-year interval, April 2026-year history of adenomatous colon polyps.    4.  Additional recommendations pending outcome of EGD.     5.  Continue to follow swallowing precautions and discussed.      Discussion:    Patient to continue omeprazole 40 mg once daily.  For further evaluation of dysphagia we will proceed with EGD.  Video swallow study was unremarkable.  Ultimately I feel that the patient would benefit from esophageal dilation even if no findings are noted on EGD.  Also to assess for potential EOE and Candida esophagitis, as patient could be experiencing symptoms secondary to either of these potential etiologies.  Additional recommendations pending outcome of EGD.    Next colonoscopy due April 2026 and patient is in recall accordingly.  Patient verbalized understanding of above plan of care and is in agreement.  All questions answered and support provided.    EMR Dragon/Transcription  Disclaimer:  This document has been Dictated utilizing Dragon dictation.

## 2023-05-31 ENCOUNTER — PREP FOR SURGERY (OUTPATIENT)
Dept: SURGERY | Facility: SURGERY CENTER | Age: 58
End: 2023-05-31

## 2023-05-31 ENCOUNTER — OFFICE VISIT (OUTPATIENT)
Dept: GASTROENTEROLOGY | Facility: CLINIC | Age: 58
End: 2023-05-31

## 2023-05-31 VITALS
TEMPERATURE: 97.7 F | SYSTOLIC BLOOD PRESSURE: 160 MMHG | DIASTOLIC BLOOD PRESSURE: 100 MMHG | WEIGHT: 229 LBS | OXYGEN SATURATION: 97 % | HEIGHT: 72 IN | HEART RATE: 81 BPM | BODY MASS INDEX: 31.02 KG/M2

## 2023-05-31 DIAGNOSIS — K21.9 GASTROESOPHAGEAL REFLUX DISEASE, UNSPECIFIED WHETHER ESOPHAGITIS PRESENT: ICD-10-CM

## 2023-05-31 DIAGNOSIS — R13.10 DYSPHAGIA, UNSPECIFIED TYPE: Chronic | ICD-10-CM

## 2023-05-31 DIAGNOSIS — R13.10 DYSPHAGIA, UNSPECIFIED TYPE: Primary | ICD-10-CM

## 2023-05-31 DIAGNOSIS — R11.10 REGURGITATION OF FOOD: ICD-10-CM

## 2023-05-31 DIAGNOSIS — Z86.010 HX OF ADENOMATOUS COLONIC POLYPS: ICD-10-CM

## 2023-05-31 DIAGNOSIS — Z12.11 COLON CANCER SCREENING: ICD-10-CM

## 2023-05-31 DIAGNOSIS — K21.9 GASTROESOPHAGEAL REFLUX DISEASE, UNSPECIFIED WHETHER ESOPHAGITIS PRESENT: Primary | Chronic | ICD-10-CM

## 2023-05-31 DIAGNOSIS — R11.10 REGURGITATION OF FOOD: Chronic | ICD-10-CM

## 2023-05-31 PROCEDURE — 99214 OFFICE O/P EST MOD 30 MIN: CPT | Performed by: NURSE PRACTITIONER

## 2023-05-31 RX ORDER — SODIUM CHLORIDE, SODIUM LACTATE, POTASSIUM CHLORIDE, CALCIUM CHLORIDE 600; 310; 30; 20 MG/100ML; MG/100ML; MG/100ML; MG/100ML
30 INJECTION, SOLUTION INTRAVENOUS CONTINUOUS PRN
OUTPATIENT
Start: 2023-05-31

## 2023-05-31 RX ORDER — SODIUM CHLORIDE 0.9 % (FLUSH) 0.9 %
10 SYRINGE (ML) INJECTION AS NEEDED
OUTPATIENT
Start: 2023-05-31

## 2023-05-31 RX ORDER — SODIUM CHLORIDE 0.9 % (FLUSH) 0.9 %
3 SYRINGE (ML) INJECTION EVERY 12 HOURS SCHEDULED
OUTPATIENT
Start: 2023-05-31

## 2023-05-31 NOTE — PATIENT INSTRUCTIONS
1.  For GERD, continue omeprazole 40 mg once daily.    2.  For further evaluation of dysphagia we will proceed with EGD with possible esophageal dilation.    3.  Next colonoscopy due to 3-year interval, April 2026-year history of adenomatous colon polyps.    4.  Additional recommendations pending outcome of EGD.     5.  Continue to follow swallowing precautions and discussed.

## 2023-06-01 PROBLEM — K21.9 GASTROESOPHAGEAL REFLUX DISEASE: Status: ACTIVE | Noted: 2023-06-01

## 2023-06-01 PROBLEM — R11.10 REGURGITATION OF FOOD: Status: ACTIVE | Noted: 2023-06-01

## 2023-07-26 ENCOUNTER — HOSPITAL ENCOUNTER (INPATIENT)
Facility: HOSPITAL | Age: 58
LOS: 2 days | Discharge: HOME | End: 2023-07-29
Attending: EMERGENCY MEDICINE | Admitting: STUDENT IN AN ORGANIZED HEALTH CARE EDUCATION/TRAINING PROGRAM
Payer: COMMERCIAL

## 2023-07-26 ENCOUNTER — APPOINTMENT (OUTPATIENT)
Dept: CT IMAGING | Facility: HOSPITAL | Age: 58
End: 2023-07-26
Payer: COMMERCIAL

## 2023-07-26 ENCOUNTER — APPOINTMENT (OUTPATIENT)
Dept: GENERAL RADIOLOGY | Facility: HOSPITAL | Age: 58
End: 2023-07-26
Payer: COMMERCIAL

## 2023-07-26 DIAGNOSIS — R47.89 WORD FINDING DIFFICULTY: ICD-10-CM

## 2023-07-26 DIAGNOSIS — R50.9 FUO (FEVER OF UNKNOWN ORIGIN): Primary | ICD-10-CM

## 2023-07-26 LAB
ALBUMIN SERPL-MCNC: 3.8 G/DL (ref 3.5–5.2)
ALBUMIN/GLOB SERPL: 1.4 G/DL
ALP SERPL-CCNC: 197 U/L (ref 39–117)
ALT SERPL W P-5'-P-CCNC: 75 U/L (ref 1–41)
ANION GAP SERPL CALCULATED.3IONS-SCNC: 7.6 MMOL/L (ref 5–15)
APAP SERPL-MCNC: <5 MCG/ML (ref 0–30)
AST SERPL-CCNC: 52 U/L (ref 1–40)
BASOPHILS # BLD AUTO: 0.02 10*3/MM3 (ref 0–0.2)
BASOPHILS NFR BLD AUTO: 0.2 % (ref 0–1.5)
BILIRUB SERPL-MCNC: 0.6 MG/DL (ref 0–1.2)
BILIRUB UR QL STRIP: NEGATIVE
BUN SERPL-MCNC: 18 MG/DL (ref 6–20)
BUN/CREAT SERPL: 22.2 (ref 7–25)
CALCIUM SPEC-SCNC: 9.1 MG/DL (ref 8.6–10.5)
CHLORIDE SERPL-SCNC: 100 MMOL/L (ref 98–107)
CLARITY UR: ABNORMAL
CO2 SERPL-SCNC: 30.4 MMOL/L (ref 22–29)
COLOR UR: YELLOW
CREAT SERPL-MCNC: 0.81 MG/DL (ref 0.76–1.27)
D-LACTATE SERPL-SCNC: 0.8 MMOL/L (ref 0.5–2)
DEPRECATED RDW RBC AUTO: 44.3 FL (ref 37–54)
EGFRCR SERPLBLD CKD-EPI 2021: 104.1 ML/MIN/1.73
EOSINOPHIL # BLD AUTO: 0.13 10*3/MM3 (ref 0–0.4)
EOSINOPHIL NFR BLD AUTO: 1.3 % (ref 0.3–6.2)
ERYTHROCYTE [DISTWIDTH] IN BLOOD BY AUTOMATED COUNT: 13 % (ref 12.3–15.4)
FLUAV SUBTYP SPEC NAA+PROBE: NOT DETECTED
FLUBV RNA ISLT QL NAA+PROBE: NOT DETECTED
GLOBULIN UR ELPH-MCNC: 2.8 GM/DL
GLUCOSE SERPL-MCNC: 126 MG/DL (ref 65–99)
GLUCOSE UR STRIP-MCNC: NEGATIVE MG/DL
HCT VFR BLD AUTO: 39.7 % (ref 37.5–51)
HGB BLD-MCNC: 12.8 G/DL (ref 13–17.7)
HGB UR QL STRIP.AUTO: ABNORMAL
HOLD SPECIMEN: NORMAL
IMM GRANULOCYTES # BLD AUTO: 0.22 10*3/MM3 (ref 0–0.05)
IMM GRANULOCYTES NFR BLD AUTO: 2.2 % (ref 0–0.5)
KETONES UR QL STRIP: ABNORMAL
LEUKOCYTE ESTERASE UR QL STRIP.AUTO: NEGATIVE
LYMPHOCYTES # BLD AUTO: 0.71 10*3/MM3 (ref 0.7–3.1)
LYMPHOCYTES NFR BLD AUTO: 7.3 % (ref 19.6–45.3)
MCH RBC QN AUTO: 29.1 PG (ref 26.6–33)
MCHC RBC AUTO-ENTMCNC: 32.2 G/DL (ref 31.5–35.7)
MCV RBC AUTO: 90.2 FL (ref 79–97)
MONOCYTES # BLD AUTO: 0.5 10*3/MM3 (ref 0.1–0.9)
MONOCYTES NFR BLD AUTO: 5.1 % (ref 5–12)
NEUTROPHILS NFR BLD AUTO: 8.2 10*3/MM3 (ref 1.7–7)
NEUTROPHILS NFR BLD AUTO: 83.9 % (ref 42.7–76)
NITRITE UR QL STRIP: NEGATIVE
PH UR STRIP.AUTO: 6 [PH] (ref 5–8)
PLATELET # BLD AUTO: 362 10*3/MM3 (ref 140–450)
PMV BLD AUTO: 9.3 FL (ref 6–12)
POTASSIUM SERPL-SCNC: 3.2 MMOL/L (ref 3.5–5.2)
PROT SERPL-MCNC: 6.6 G/DL (ref 6–8.5)
PROT UR QL STRIP: ABNORMAL
RBC # BLD AUTO: 4.4 10*6/MM3 (ref 4.14–5.8)
SARS-COV-2 RNA RESP QL NAA+PROBE: NOT DETECTED
SODIUM SERPL-SCNC: 138 MMOL/L (ref 136–145)
SP GR UR STRIP: 1.02 (ref 1–1.03)
STREP A PCR: NOT DETECTED
UROBILINOGEN UR QL STRIP: ABNORMAL
WBC NRBC COR # BLD: 9.78 10*3/MM3 (ref 3.4–10.8)
WHOLE BLOOD HOLD SPECIMEN: NORMAL

## 2023-07-26 PROCEDURE — 87651 STREP A DNA AMP PROBE: CPT | Performed by: EMERGENCY MEDICINE

## 2023-07-26 PROCEDURE — 87798 DETECT AGENT NOS DNA AMP: CPT | Performed by: EMERGENCY MEDICINE

## 2023-07-26 PROCEDURE — 36415 COLL VENOUS BLD VENIPUNCTURE: CPT

## 2023-07-26 PROCEDURE — 87468 ANAPLSMA PHGCYTOPHLM AMP PRB: CPT | Performed by: EMERGENCY MEDICINE

## 2023-07-26 PROCEDURE — 86618 LYME DISEASE ANTIBODY: CPT | Performed by: EMERGENCY MEDICINE

## 2023-07-26 PROCEDURE — 96365 THER/PROPH/DIAG IV INF INIT: CPT

## 2023-07-26 PROCEDURE — 71046 X-RAY EXAM CHEST 2 VIEWS: CPT

## 2023-07-26 PROCEDURE — 83605 ASSAY OF LACTIC ACID: CPT | Performed by: EMERGENCY MEDICINE

## 2023-07-26 PROCEDURE — 87636 SARSCOV2 & INF A&B AMP PRB: CPT | Performed by: EMERGENCY MEDICINE

## 2023-07-26 PROCEDURE — 80143 DRUG ASSAY ACETAMINOPHEN: CPT | Performed by: EMERGENCY MEDICINE

## 2023-07-26 PROCEDURE — 70450 CT HEAD/BRAIN W/O DYE: CPT

## 2023-07-26 PROCEDURE — 81001 URINALYSIS AUTO W/SCOPE: CPT | Performed by: EMERGENCY MEDICINE

## 2023-07-26 PROCEDURE — 99284 EMERGENCY DEPT VISIT MOD MDM: CPT

## 2023-07-26 PROCEDURE — 80053 COMPREHEN METABOLIC PANEL: CPT | Performed by: EMERGENCY MEDICINE

## 2023-07-26 PROCEDURE — 85025 COMPLETE CBC W/AUTO DIFF WBC: CPT | Performed by: EMERGENCY MEDICINE

## 2023-07-26 PROCEDURE — 87040 BLOOD CULTURE FOR BACTERIA: CPT | Performed by: EMERGENCY MEDICINE

## 2023-07-26 PROCEDURE — 99285 EMERGENCY DEPT VISIT HI MDM: CPT | Performed by: EMERGENCY MEDICINE

## 2023-07-26 PROCEDURE — 87484 EHRLICHA CHAFFEENSIS AMP PRB: CPT | Performed by: EMERGENCY MEDICINE

## 2023-07-26 RX ORDER — SODIUM CHLORIDE 0.9 % (FLUSH) 0.9 %
10 SYRINGE (ML) INJECTION AS NEEDED
Status: DISCONTINUED | OUTPATIENT
Start: 2023-07-26 | End: 2023-07-29 | Stop reason: HOSPADM

## 2023-07-26 RX ADMIN — SODIUM CHLORIDE 1000 ML: 9 INJECTION, SOLUTION INTRAVENOUS at 20:39

## 2023-07-26 RX ADMIN — DOXYCYCLINE 100 MG: 100 INJECTION, POWDER, LYOPHILIZED, FOR SOLUTION INTRAVENOUS at 23:06

## 2023-07-27 PROBLEM — R21 RASH: Status: ACTIVE | Noted: 2023-07-27

## 2023-07-27 PROBLEM — R79.89 ELEVATED LFTS: Status: ACTIVE | Noted: 2023-07-27

## 2023-07-27 LAB
ALBUMIN SERPL-MCNC: 3.3 G/DL (ref 3.5–5.2)
ALP SERPL-CCNC: 160 U/L (ref 39–117)
ALT SERPL W P-5'-P-CCNC: 65 U/L (ref 1–41)
ANION GAP SERPL CALCULATED.3IONS-SCNC: 8.1 MMOL/L (ref 5–15)
AST SERPL-CCNC: 45 U/L (ref 1–40)
B PARAPERT DNA SPEC QL NAA+PROBE: NOT DETECTED
B PERT DNA SPEC QL NAA+PROBE: NOT DETECTED
BACTERIA UR QL AUTO: ABNORMAL /HPF
BILIRUB CONJ SERPL-MCNC: 0.3 MG/DL (ref 0–0.3)
BILIRUB INDIRECT SERPL-MCNC: 0.4 MG/DL
BILIRUB SERPL-MCNC: 0.7 MG/DL (ref 0–1.2)
BUN SERPL-MCNC: 11 MG/DL (ref 6–20)
BUN/CREAT SERPL: 13.4 (ref 7–25)
C PNEUM DNA NPH QL NAA+NON-PROBE: NOT DETECTED
CALCIUM SPEC-SCNC: 8.5 MG/DL (ref 8.6–10.5)
CHLORIDE SERPL-SCNC: 105 MMOL/L (ref 98–107)
CO2 SERPL-SCNC: 27.9 MMOL/L (ref 22–29)
CREAT SERPL-MCNC: 0.82 MG/DL (ref 0.76–1.27)
CRP SERPL-MCNC: 5.3 MG/DL (ref 0–0.5)
DEPRECATED RDW RBC AUTO: 42.6 FL (ref 37–54)
EGFRCR SERPLBLD CKD-EPI 2021: 103.7 ML/MIN/1.73
ERYTHROCYTE [DISTWIDTH] IN BLOOD BY AUTOMATED COUNT: 12.9 % (ref 12.3–15.4)
ERYTHROCYTE [SEDIMENTATION RATE] IN BLOOD: 22 MM/HR (ref 0–20)
FERRITIN SERPL-MCNC: 430 NG/ML (ref 30–400)
FLUAV SUBTYP SPEC NAA+PROBE: NOT DETECTED
FLUBV RNA ISLT QL NAA+PROBE: NOT DETECTED
GLUCOSE SERPL-MCNC: 106 MG/DL (ref 65–99)
HADV DNA SPEC NAA+PROBE: NOT DETECTED
HAV IGM SERPL QL IA: NORMAL
HBV CORE IGM SERPL QL IA: NORMAL
HBV SURFACE AG SERPL QL IA: NORMAL
HCOV 229E RNA SPEC QL NAA+PROBE: NOT DETECTED
HCOV HKU1 RNA SPEC QL NAA+PROBE: NOT DETECTED
HCOV NL63 RNA SPEC QL NAA+PROBE: NOT DETECTED
HCOV OC43 RNA SPEC QL NAA+PROBE: NOT DETECTED
HCT VFR BLD AUTO: 38.9 % (ref 37.5–51)
HCV AB SER DONR QL: NORMAL
HGB BLD-MCNC: 12.8 G/DL (ref 13–17.7)
HMPV RNA NPH QL NAA+NON-PROBE: NOT DETECTED
HPIV1 RNA ISLT QL NAA+PROBE: NOT DETECTED
HPIV2 RNA SPEC QL NAA+PROBE: NOT DETECTED
HPIV3 RNA NPH QL NAA+PROBE: NOT DETECTED
HPIV4 P GENE NPH QL NAA+PROBE: NOT DETECTED
HYALINE CASTS UR QL AUTO: ABNORMAL /LPF
M PNEUMO IGG SER IA-ACNC: NOT DETECTED
MCH RBC QN AUTO: 29.4 PG (ref 26.6–33)
MCHC RBC AUTO-ENTMCNC: 32.9 G/DL (ref 31.5–35.7)
MCV RBC AUTO: 89.4 FL (ref 79–97)
MUCOUS THREADS URNS QL MICRO: ABNORMAL /HPF
PLATELET # BLD AUTO: 330 10*3/MM3 (ref 140–450)
PMV BLD AUTO: 9.3 FL (ref 6–12)
POTASSIUM SERPL-SCNC: 3.8 MMOL/L (ref 3.5–5.2)
POTASSIUM SERPL-SCNC: 3.9 MMOL/L (ref 3.5–5.2)
PROT SERPL-MCNC: 6.1 G/DL (ref 6–8.5)
RBC # BLD AUTO: 4.35 10*6/MM3 (ref 4.14–5.8)
RBC # UR STRIP: ABNORMAL /HPF
REF LAB TEST METHOD: ABNORMAL
RHINOVIRUS RNA SPEC NAA+PROBE: NOT DETECTED
RPR SER QL: NORMAL
RSV RNA NPH QL NAA+NON-PROBE: NOT DETECTED
SARS-COV-2 RNA NPH QL NAA+NON-PROBE: NOT DETECTED
SODIUM SERPL-SCNC: 141 MMOL/L (ref 136–145)
SQUAMOUS #/AREA URNS HPF: ABNORMAL /HPF
WBC # UR STRIP: ABNORMAL /HPF
WBC NRBC COR # BLD: 11.95 10*3/MM3 (ref 3.4–10.8)

## 2023-07-27 PROCEDURE — 86738 MYCOPLASMA ANTIBODY: CPT | Performed by: INTERNAL MEDICINE

## 2023-07-27 PROCEDURE — 0202U NFCT DS 22 TRGT SARS-COV-2: CPT | Performed by: INTERNAL MEDICINE

## 2023-07-27 PROCEDURE — 80074 ACUTE HEPATITIS PANEL: CPT | Performed by: INTERNAL MEDICINE

## 2023-07-27 PROCEDURE — 86235 NUCLEAR ANTIGEN ANTIBODY: CPT | Performed by: INTERNAL MEDICINE

## 2023-07-27 PROCEDURE — 86225 DNA ANTIBODY NATIVE: CPT | Performed by: INTERNAL MEDICINE

## 2023-07-27 PROCEDURE — 86592 SYPHILIS TEST NON-TREP QUAL: CPT | Performed by: INTERNAL MEDICINE

## 2023-07-27 PROCEDURE — 82164 ANGIOTENSIN I ENZYME TEST: CPT | Performed by: INTERNAL MEDICINE

## 2023-07-27 PROCEDURE — 80048 BASIC METABOLIC PNL TOTAL CA: CPT | Performed by: NURSE PRACTITIONER

## 2023-07-27 PROCEDURE — 86788 WEST NILE VIRUS AB IGM: CPT | Performed by: INTERNAL MEDICINE

## 2023-07-27 PROCEDURE — 82728 ASSAY OF FERRITIN: CPT | Performed by: INTERNAL MEDICINE

## 2023-07-27 PROCEDURE — 83516 IMMUNOASSAY NONANTIBODY: CPT | Performed by: INTERNAL MEDICINE

## 2023-07-27 PROCEDURE — 86789 WEST NILE VIRUS ANTIBODY: CPT | Performed by: INTERNAL MEDICINE

## 2023-07-27 PROCEDURE — 86037 ANCA TITER EACH ANTIBODY: CPT | Performed by: INTERNAL MEDICINE

## 2023-07-27 PROCEDURE — 85652 RBC SED RATE AUTOMATED: CPT | Performed by: INTERNAL MEDICINE

## 2023-07-27 PROCEDURE — 84132 ASSAY OF SERUM POTASSIUM: CPT | Performed by: STUDENT IN AN ORGANIZED HEALTH CARE EDUCATION/TRAINING PROGRAM

## 2023-07-27 PROCEDURE — 80076 HEPATIC FUNCTION PANEL: CPT | Performed by: NURSE PRACTITIONER

## 2023-07-27 PROCEDURE — 87385 HISTOPLASMA CAPSUL AG IA: CPT | Performed by: INTERNAL MEDICINE

## 2023-07-27 PROCEDURE — 99223 1ST HOSP IP/OBS HIGH 75: CPT | Performed by: INTERNAL MEDICINE

## 2023-07-27 PROCEDURE — 86140 C-REACTIVE PROTEIN: CPT | Performed by: INTERNAL MEDICINE

## 2023-07-27 PROCEDURE — 85027 COMPLETE CBC AUTOMATED: CPT | Performed by: NURSE PRACTITIONER

## 2023-07-27 RX ORDER — BISACODYL 5 MG/1
5 TABLET, DELAYED RELEASE ORAL DAILY PRN
Status: DISCONTINUED | OUTPATIENT
Start: 2023-07-27 | End: 2023-07-29 | Stop reason: HOSPADM

## 2023-07-27 RX ORDER — PANTOPRAZOLE SODIUM 40 MG/1
40 TABLET, DELAYED RELEASE ORAL
Status: DISCONTINUED | OUTPATIENT
Start: 2023-07-27 | End: 2023-07-29 | Stop reason: HOSPADM

## 2023-07-27 RX ORDER — ATORVASTATIN CALCIUM 20 MG/1
20 TABLET, FILM COATED ORAL DAILY
Status: DISCONTINUED | OUTPATIENT
Start: 2023-07-27 | End: 2023-07-27

## 2023-07-27 RX ORDER — SODIUM CHLORIDE 0.9 % (FLUSH) 0.9 %
10 SYRINGE (ML) INJECTION EVERY 12 HOURS SCHEDULED
Status: DISCONTINUED | OUTPATIENT
Start: 2023-07-27 | End: 2023-07-29 | Stop reason: HOSPADM

## 2023-07-27 RX ORDER — LISINOPRIL 20 MG/1
20 TABLET ORAL DAILY
Status: DISCONTINUED | OUTPATIENT
Start: 2023-07-27 | End: 2023-07-29 | Stop reason: HOSPADM

## 2023-07-27 RX ORDER — SODIUM CHLORIDE 9 MG/ML
40 INJECTION, SOLUTION INTRAVENOUS AS NEEDED
Status: DISCONTINUED | OUTPATIENT
Start: 2023-07-27 | End: 2023-07-29 | Stop reason: HOSPADM

## 2023-07-27 RX ORDER — AMOXICILLIN 250 MG
2 CAPSULE ORAL 2 TIMES DAILY
Status: DISCONTINUED | OUTPATIENT
Start: 2023-07-27 | End: 2023-07-29 | Stop reason: HOSPADM

## 2023-07-27 RX ORDER — SODIUM CHLORIDE 0.9 % (FLUSH) 0.9 %
10 SYRINGE (ML) INJECTION AS NEEDED
Status: DISCONTINUED | OUTPATIENT
Start: 2023-07-27 | End: 2023-07-29 | Stop reason: HOSPADM

## 2023-07-27 RX ORDER — BUPROPION HYDROCHLORIDE 150 MG/1
150 TABLET ORAL DAILY
Status: DISCONTINUED | OUTPATIENT
Start: 2023-07-27 | End: 2023-07-29 | Stop reason: HOSPADM

## 2023-07-27 RX ORDER — ACETAMINOPHEN 160 MG/5ML
650 SOLUTION ORAL EVERY 4 HOURS PRN
Status: DISCONTINUED | OUTPATIENT
Start: 2023-07-27 | End: 2023-07-29 | Stop reason: HOSPADM

## 2023-07-27 RX ORDER — POLYETHYLENE GLYCOL 3350 17 G/17G
17 POWDER, FOR SOLUTION ORAL DAILY PRN
Status: DISCONTINUED | OUTPATIENT
Start: 2023-07-27 | End: 2023-07-29 | Stop reason: HOSPADM

## 2023-07-27 RX ORDER — POTASSIUM CHLORIDE 750 MG/1
40 TABLET, FILM COATED, EXTENDED RELEASE ORAL EVERY 4 HOURS
Status: COMPLETED | OUTPATIENT
Start: 2023-07-27 | End: 2023-07-27

## 2023-07-27 RX ORDER — NITROGLYCERIN 0.4 MG/1
0.4 TABLET SUBLINGUAL
Status: DISCONTINUED | OUTPATIENT
Start: 2023-07-27 | End: 2023-07-27

## 2023-07-27 RX ORDER — ACETAMINOPHEN 325 MG/1
650 TABLET ORAL EVERY 4 HOURS PRN
Status: DISCONTINUED | OUTPATIENT
Start: 2023-07-27 | End: 2023-07-29 | Stop reason: HOSPADM

## 2023-07-27 RX ORDER — ACETAMINOPHEN 650 MG/1
650 SUPPOSITORY RECTAL EVERY 4 HOURS PRN
Status: DISCONTINUED | OUTPATIENT
Start: 2023-07-27 | End: 2023-07-29 | Stop reason: HOSPADM

## 2023-07-27 RX ORDER — BISACODYL 10 MG
10 SUPPOSITORY, RECTAL RECTAL DAILY PRN
Status: DISCONTINUED | OUTPATIENT
Start: 2023-07-27 | End: 2023-07-29 | Stop reason: HOSPADM

## 2023-07-27 RX ORDER — SERTRALINE HYDROCHLORIDE 100 MG/1
100 TABLET, FILM COATED ORAL DAILY
Status: DISCONTINUED | OUTPATIENT
Start: 2023-07-27 | End: 2023-07-29 | Stop reason: HOSPADM

## 2023-07-27 RX ADMIN — LISINOPRIL 20 MG: 20 TABLET ORAL at 09:01

## 2023-07-27 RX ADMIN — SERTRALINE 100 MG: 100 TABLET, FILM COATED ORAL at 09:01

## 2023-07-27 RX ADMIN — BUPROPION HYDROCHLORIDE 150 MG: 150 TABLET, EXTENDED RELEASE ORAL at 09:01

## 2023-07-27 RX ADMIN — PANTOPRAZOLE SODIUM 40 MG: 40 TABLET, DELAYED RELEASE ORAL at 06:06

## 2023-07-27 RX ADMIN — ATORVASTATIN CALCIUM 20 MG: 20 TABLET, FILM COATED ORAL at 09:01

## 2023-07-27 RX ADMIN — POTASSIUM CHLORIDE 40 MEQ: 750 TABLET, EXTENDED RELEASE ORAL at 02:11

## 2023-07-27 RX ADMIN — ACETAMINOPHEN 650 MG: 325 TABLET, FILM COATED ORAL at 17:00

## 2023-07-27 RX ADMIN — ACETAMINOPHEN 650 MG: 325 TABLET, FILM COATED ORAL at 09:05

## 2023-07-27 RX ADMIN — Medication 10 ML: at 01:40

## 2023-07-27 RX ADMIN — POTASSIUM CHLORIDE 40 MEQ: 750 TABLET, EXTENDED RELEASE ORAL at 06:06

## 2023-07-27 RX ADMIN — DOXYCYCLINE 100 MG: 100 INJECTION, POWDER, LYOPHILIZED, FOR SOLUTION INTRAVENOUS at 23:04

## 2023-07-27 RX ADMIN — ACETAMINOPHEN 650 MG: 325 TABLET, FILM COATED ORAL at 01:40

## 2023-07-27 RX ADMIN — Medication 10 ML: at 20:12

## 2023-07-27 RX ADMIN — DOXYCYCLINE 100 MG: 100 INJECTION, POWDER, LYOPHILIZED, FOR SOLUTION INTRAVENOUS at 10:52

## 2023-07-27 RX ADMIN — Medication 10 ML: at 09:00

## 2023-07-27 NOTE — PROGRESS NOTES
Discharge Planning Assessment  [facility]     Patient Name: Sanjeev Stubbs  MRN: [id number]  Today's Date: 7/27/2023    Admit Date: 7/26/2023    Plan: Plans home with family support   Discharge Needs Assessment       Row Name 07/27/23 1420       Living Environment    People in Home spouse    Current Living Arrangements home    Potentially Unsafe Housing Conditions none    Primary Care Provided by self    Provides Primary Care For no one    Family Caregiver if Needed spouse    Family Caregiver Names Kayleen Stubbs cell [phone]    Quality of Family Relationships supportive    Able to Return to Prior Arrangements yes       Resource/Environmental Concerns    Resource/Environmental Concerns none       Transition Planning    Patient/Family Anticipates Transition to home with family    Patient/Family Anticipated Services at Transition none       Discharge Needs Assessment    Readmission Within the Last 30 Days no previous admission in last 30 days    Equipment Currently Used at Home none    Concerns to be Addressed no discharge needs identified    Equipment Needed After Discharge none                   Discharge Plan       Row Name 07/27/23 1416       Plan    Plan Plans home with family support    Patient/Family in Agreement with Plan yes    Plan Comments Spoke with pt and wife at bedside. Verified correct information on facesheet and explained the role of CCP. Pt states they live in a tri level home with 1 step to enter and then 4-5 steps to the upstairs and downstairs of the home. Pt was independent of ADLs and does not own any DME. Wife states she works for [employer] and is able to get DME if needed. Pt states he has had [facility] in the past and is agreeable to use them again if needed. Pt does request [facility] and PCP confirmed/Dr. Garcia. Plan at this time will be to d/c home with family support. CCP to follow to see if any needs arise.                  Continued Care and Services - Admitted Since 7/26/2023     Coordination has not been started for this encounter.          Demographic Summary    No documentation.                  Functional Status    No documentation.                  Psychosocial    No documentation.                  Abuse/Neglect    No documentation.                  Legal    No documentation.                  Substance Abuse    No documentation.                  Patient Forms    No documentation.                     Sunitha Finley RN

## 2023-07-27 NOTE — PAYOR COMM NOTE
"Sanjeev Armstrong (57 y.o. Male)          INPATIENT REQUEST FOR MEMBER [id number]    CONTACT LADONNA ZHANG  P# [phone]  F# [phone]         Date of Birth   1965    Social Security Number       Address   [address] [place] [address]    Home Phone       MRN   [id number]       Islam   Sabianist    Marital Status                               Admission Date   7/26/23    Admission Type   Urgent    Admitting Provider   Ed Lee MD    Attending Provider   Mateusz Mulligan MD    Department, Room/Bed   [facility] [address], P476/1       Discharge Date       Discharge Disposition       Discharge Destination                                 Attending Provider: Mateusz Mulligan MD    Allergies: No Known Allergies    Isolation: None   Infection: COVID (rule out) (07/27/23)   Code Status: CPR    Ht: 182.9 cm (72\")   Wt: 104 kg (229 lb 8 oz)    Admission Cmt: None   Principal Problem: FUO (fever of unknown origin) [R50.9]                   Active Insurance as of 7/26/2023       Primary Coverage       Payor Plan Insurance Group Employer/Plan Group    [facility] [employer]UE [employer] EMPLOYEE [id number]       Payor Plan Address Payor Plan Phone Number Payor Plan Fax Number Effective Dates    [phone]  1/1/2022 - None Entered    [place] [address]         Subscriber Name Subscriber Birth Date Member ID       SANJEEV ARMSTRONG 1965 [id number]                     Emergency Contacts        (Rel.) Home Phone Work Phone Mobile Phone    Kayleen Armstrong (Spouse) [phone] -- --                 History & Physical        Paula Nava APRN at 07/27/23 0829              Patient Name:  Sanjeev Armstrong  YOB: 1965  MRN:  [id number]  Admit Date:  7/26/2023  Patient Care Team:  Harshil Garcia MD as PCP - General (Family Medicine)  Giselle Meng APRN as Nurse Practitioner (Gastroenterology)      Subjective  History " Present Illness     Chief Complaint   Patient presents with    Speech Problem    Tremors     During triage multiple circular macular red areas over body,and extremities        Mr. Stubbs is a 57 y.o. non-smoker with a history of HTN, depression that presents to [facility] complaining of fevers, rash, tremors, speech difficulty. Traveled a few weeks ago that involved hiking. A few days after returning home, he started w/fevers and myalgias. He was seen at  on 7/15/23, thought to have a viral illness w/recommendation to treat symptoms. Since then he has had nearly daily fevers and over the past 2-3 days has had tremors that make writing difficult and has had word finding difficulty, noticed by colleagues. He has had frequent headaches. He was having neck and jaw pain that has improved. Denies sore throat, cough, runny nose, soa, chest pain, n/v/d, dysuria, impaired gait/balance. He has also developed a red circular rash on all extremities and trunk; denies itching, pain.    Afebrile. HR controlled. BP stable. On room air. WBC 9.78, Hgb 12.8. Tick borne labs pending. Lactic 0.8. BC collected. Gluc 126, K 3.2, CO2 30.4, ALT/AST 75/52, Alk phos 197. Strep A neg. UA neg. Covid/Influenza neg. CTH neg. CXR: scarring vs atelectasis      Review of Systems   Constitutional:  Positive for appetite change, fatigue and fever.   HENT:  Negative for congestion.    Respiratory:  Negative for cough and shortness of breath.    Cardiovascular:  Negative for chest pain.   Gastrointestinal:  Negative for diarrhea, nausea and vomiting.   Genitourinary:  Negative for dysuria.   Musculoskeletal:  Positive for arthralgias, myalgias and neck pain.   Skin:  Positive for rash.   Neurological:  Positive for tremors, speech difficulty and headaches.   Psychiatric/Behavioral:  Negative for sleep disturbance.       Personal History     Past Medical History:   Diagnosis Date    Depression     Hypertension      Past Surgical History:    Procedure Laterality Date    ANKLE SURGERY Left     COLONOSCOPY      COLONOSCOPY N/A 4/19/2023    Procedure: COLONOSCOPY FOR SCREENING;  Surgeon: Dax Colon MD;  Location: [facility] [place] MAIN OR;  Service: Gastroenterology;  Laterality: N/A;  polyps    ENDOSCOPY N/A 7/5/2023    Procedure: ESOPHAGOGASTRODUODENOSCOPY with possible esophageal dilation;  Surgeon: Dax Colon MD;  Location: [facility] [place] MAIN OR;  Service: Gastroenterology;  Laterality: N/A;  gastric polyps, dilated to 15 mm, esophagitis and stricture     Family History   Problem Relation Age of Onset    Diverticulitis Mother     Migraines Father     Colon cancer Neg Hx     Colon polyps Neg Hx     Irritable bowel syndrome Neg Hx     Ulcerative colitis Neg Hx     Crohn's disease Neg Hx      Social History     Tobacco Use    Smoking status: Never    Smokeless tobacco: Never   Vaping Use    Vaping Use: Never used   Substance Use Topics    Alcohol use: Yes     Comment: socially    Drug use: No     No current facility-administered medications on file prior to encounter.     Current Outpatient Medications on File Prior to Encounter   Medication Sig Dispense Refill    acetaminophen (TYLENOL) 325 MG tablet Take 2 tablets by mouth Every 6 (Six) Hours As Needed for Mild Pain.      atorvastatin (LIPITOR) 20 MG tablet Take 1 tablet by mouth Daily. 90 tablet 3    buPROPion XL (WELLBUTRIN XL) 150 MG 24 hr tablet Take 1 tablet by mouth Every Morning. 90 tablet 3    lisinopril (PRINIVIL,ZESTRIL) 20 MG tablet Take 1 tablet by mouth Daily. 90 tablet 1    omeprazole (priLOSEC) 40 MG capsule Take 1 capsule by mouth 2 (Two) Times a Day. 60 capsule 5    sertraline (ZOLOFT) 100 MG tablet Take 1 tablet by mouth Daily. 90 tablet 3     No Known Allergies    Objective   Objective     Vital Signs  Temp:  [98.5 °F (36.9 °C)-101.7 °F (38.7 °C)] 98.7 °F (37.1 °C)  Heart Rate:  [85-98] 91  Resp:  [16] 16  BP: (129-164)/(70-98) 147/84  SpO2:  [94 %-97 %] 94 %  on   ;   Device  (Oxygen Therapy): room air  Body mass index is 31.13 kg/m².    Physical Exam  Vitals and nursing note reviewed.   Constitutional:       General: He is not in acute distress.     Appearance: He is ill-appearing. He is not toxic-appearing.      Comments: Fatigued, weak appearance   HENT:      Head: Normocephalic.      Mouth/Throat:      Mouth: Mucous membranes are moist.   Eyes:      Conjunctiva/sclera: Conjunctivae normal.   Cardiovascular:      Rate and Rhythm: Normal rate and regular rhythm.   Pulmonary:      Effort: Pulmonary effort is normal. No respiratory distress.      Breath sounds: No wheezing or rales.   Abdominal:      General: Bowel sounds are normal.      Palpations: Abdomen is soft.   Musculoskeletal:      Cervical back: Neck supple.      Right lower leg: No edema.      Left lower leg: No edema.   Skin:     General: Skin is warm and dry.      Findings: Rash present.      Comments: Circular erythematous rash on all extremities and trunk; flat; not blistered or scaly   Neurological:      Mental Status: He is alert and oriented to person, place, and time.   Psychiatric:         Mood and Affect: Mood normal.         Behavior: Behavior normal.       Results Review:  I reviewed the patient's new clinical results.  I reviewed the patient's new imaging results and agree with the interpretation.  I reviewed the patient's other test results and agree with the interpretation  I personally viewed and interpreted the patient's EKG/Telemetry data    Lab Results (last 24 hours)       Procedure Component Value Units Date/Time    CBC & Differential [[id number]]  (Abnormal) Collected: 07/26/23 2016    Specimen: Blood Updated: 07/26/23 2020    Narrative:      The following orders were created for panel order CBC & Differential.  Procedure                               Abnormality         Status                     ---------                               -----------         ------                     CBC Auto  Differential[[id number]]        Abnormal            Final result                 Please view results for these tests on the individual orders.    Comprehensive Metabolic Panel [[id number]]  (Abnormal) Collected: 07/26/23 2016    Specimen: Blood Updated: 07/26/23 2041     Glucose 126 mg/dL      BUN 18 mg/dL      Creatinine 0.81 mg/dL      Sodium 138 mmol/L      Potassium 3.2 mmol/L      Chloride 100 mmol/L      CO2 30.4 mmol/L      Calcium 9.1 mg/dL      Total Protein 6.6 g/dL      Albumin 3.8 g/dL      ALT (SGPT) 75 U/L      AST (SGOT) 52 U/L      Alkaline Phosphatase 197 U/L      Total Bilirubin 0.6 mg/dL      Globulin 2.8 gm/dL      A/G Ratio 1.4 g/dL      BUN/Creatinine Ratio 22.2     Anion Gap 7.6 mmol/L      eGFR 104.1 mL/min/1.73     Narrative:      GFR Normal >60  Chronic Kidney Disease <60  Kidney Failure <15      Lyme Disease Total Antibody With Reflex to Immunoassay [[id number]] Collected: 07/26/23 2016    Specimen: Blood Updated: 07/26/23 2016    CBC Auto Differential [[id number]]  (Abnormal) Collected: 07/26/23 2016    Specimen: Blood Updated: 07/26/23 2020     WBC 9.78 10*3/mm3      RBC 4.40 10*6/mm3      Hemoglobin 12.8 g/dL      Hematocrit 39.7 %      MCV 90.2 fL      MCH 29.1 pg      MCHC 32.2 g/dL      RDW 13.0 %      RDW-SD 44.3 fl      MPV 9.3 fL      Platelets 362 10*3/mm3      Neutrophil % 83.9 %      Lymphocyte % 7.3 %      Monocyte % 5.1 %      Eosinophil % 1.3 %      Basophil % 0.2 %      Immature Grans % 2.2 %      Neutrophils, Absolute 8.20 10*3/mm3      Lymphocytes, Absolute 0.71 10*3/mm3      Monocytes, Absolute 0.50 10*3/mm3      Eosinophils, Absolute 0.13 10*3/mm3      Basophils, Absolute 0.02 10*3/mm3      Immature Grans, Absolute 0.22 10*3/mm3     Lactic Acid, Plasma [[id number]]  (Normal) Collected: 07/26/23 2017    Specimen: Blood Updated: 07/26/23 2035     Lactate 0.8 mmol/L     Blood Culture - Blood, Arm, Right [[id number]] Collected: 07/26/23 2022    Specimen: Blood from Arm, Right  Updated: 07/26/23 2039    Ehrlichia Profile DNA PCR [[id number]] Collected: 07/26/23 2022    Specimen: Blood Updated: 07/26/23 2023    Blood Culture - Blood, Arm, Right [[id number]] Collected: 07/26/23 2029    Specimen: Blood from Arm, Right Updated: 07/26/23 2038    Rapid Strep A Screen - Swab, Throat [[id number]]  (Normal) Collected: 07/26/23 2029    Specimen: Swab from Throat Updated: 07/26/23 2117     STREP A PCR Not Detected    Rickettsia Species DNA, Real-Time PCR [[id number]] Collected: 07/26/23 2029    Specimen: Blood Updated: 07/26/23 2039    Urinalysis With Culture If Indicated - Urine, Clean Catch [[id number]]  (Abnormal) Collected: 07/26/23 2033    Specimen: Urine, Clean Catch Updated: 07/26/23 2058     Color, UA Yellow     Appearance, UA Slightly Cloudy     pH, UA 6.0     Specific Gravity, UA 1.025     Glucose, UA Negative     Ketones, UA Trace     Bilirubin, UA Negative     Blood, UA Small (1+)     Protein, UA 30 mg/dL (1+)     Leuk Esterase, UA Negative     Nitrite, UA Negative     Urobilinogen, UA 0.2 E.U./dL    Narrative:      In absence of clinical symptoms, the presence of pyuria, bacteria, and/or nitrites on the urinalysis result does not correlate with infection.    Urinalysis, Microscopic Only - Urine, Clean Catch [[id number]] Collected: 07/26/23 2033    Specimen: Urine, Clean Catch Updated: 07/26/23 2058    COVID-19 and FLU A/B PCR - Swab, Nasopharynx [[id number]]  (Normal) Collected: 07/26/23 2043    Specimen: Swab from Nasopharynx Updated: 07/26/23 2113     COVID19 Not Detected     Influenza A PCR Not Detected     Influenza B PCR Not Detected    Narrative:      Fact sheet for providers: https://www.fda.gov/media/118787/download    Fact sheet for patients: https://www.fda.gov/media/057451/download    Test performed by PCR.    Acetaminophen Level [[id number]]  (Normal) Collected: 07/26/23 2200    Specimen: Blood Updated: 07/26/23 2214     Acetaminophen <5.0 mcg/mL     Basic Metabolic Panel [[id number]]   (Abnormal) Collected: 07/27/23 0610    Specimen: Blood Updated: 07/27/23 0712     Glucose 106 mg/dL      BUN 11 mg/dL      Creatinine 0.82 mg/dL      Sodium 141 mmol/L      Potassium 3.8 mmol/L      Chloride 105 mmol/L      CO2 27.9 mmol/L      Calcium 8.5 mg/dL      BUN/Creatinine Ratio 13.4     Anion Gap 8.1 mmol/L      eGFR 103.7 mL/min/1.73     Narrative:      GFR Normal >60  Chronic Kidney Disease <60  Kidney Failure <15      CBC (No Diff) [[id number]]  (Abnormal) Collected: 07/27/23 0610    Specimen: Blood Updated: 07/27/23 0650     WBC 11.95 10*3/mm3      RBC 4.35 10*6/mm3      Hemoglobin 12.8 g/dL      Hematocrit 38.9 %      MCV 89.4 fL      MCH 29.4 pg      MCHC 32.9 g/dL      RDW 12.9 %      RDW-SD 42.6 fl      MPV 9.3 fL      Platelets 330 10*3/mm3     Hepatic Function Panel [[id number]]  (Abnormal) Collected: 07/27/23 0610    Specimen: Blood Updated: 07/27/23 0854     Total Protein 6.1 g/dL      Albumin 3.3 g/dL      ALT (SGPT) 65 U/L      AST (SGOT) 45 U/L      Alkaline Phosphatase 160 U/L      Total Bilirubin 0.7 mg/dL      Bilirubin, Direct 0.3 mg/dL      Bilirubin, Indirect 0.4 mg/dL     Potassium [[id number]] Collected: 07/27/23 1042    Specimen: Blood Updated: 07/27/23 1102    West Nile Antibodies, IgG & IgM [[id number]] Collected: 07/27/23 1042    Specimen: Blood Updated: 07/27/23 1102    RPR [[id number]] Collected: 07/27/23 1042    Specimen: Blood Updated: 07/27/23 1103            Imaging Results (Last 24 Hours)       Procedure Component Value Units Date/Time    XR Chest 2 View [[id number]] Collected: 07/26/23 2109     Updated: 07/26/23 2113    Narrative:      PA AND LATERAL CHEST RADIOGRAPH     HISTORY: Tremors. Difficulty with speech.     COMPARISON: None available.     FINDINGS:  Heart size is within normal limits. No pneumothorax, pleural effusion,  or acute infiltrate is seen. There is some linear scarring versus  atelectasis noted at the left lung base.       Impression:      Linear scarring  versus atelectasis noted at the left lung base.     This report was finalized on 7/26/2023 9:10 PM by Dr. Betsy Grider M.D.       CT Head Without Contrast [[id number]] Collected: 07/26/23 2104     Updated: 07/26/23 2111    Narrative:      CT HEAD WITHOUT CONTRAST     HISTORY: Confusion     COMPARISON: None available.     TECHNIQUE: Axial CT imaging was obtained through the brain. No IV  contrast was administered.     FINDINGS:  No acute intracranial hemorrhage is seen. Ventricles are normal in size.  There is no midline shift or mass effect. There is some periventricular  and deep white matter microangiopathic change. Mucous retention cyst is  noted within the left maxillary sinus. Left mastoid air cells are  relatively underpneumatized, which may reflect changes of chronic  mastoiditis/otitis media.       Impression:      No acute intracranial findings.     Radiation dose reduction techniques were utilized, including automated  exposure control and exposure modulation based on body size.     This report was finalized on 7/26/2023 9:08 PM by Dr. Betsy Grider M.D.                   No orders to display        Assessment/Plan     Active Hospital Problems    Diagnosis  POA    **FUO (fever of unknown origin) [R50.9]  Yes    Rash [R21]  Yes    Elevated LFTs [R79.89]  Yes    Mixed hyperlipidemia [E78.2]  Yes    Essential hypertension [I10]  Yes    Depression [F32.A]  Yes      Resolved Hospital Problems   No resolved problems to display.       Mr. Stubbs is a 57 y.o. non-smoker with a history of HTN, depression who is admitted for fever of unknown origin, rash     Fever/Rash:  -CTH negative to explain tremors/word finding difficulty. No neuro deficits on exam. Continue antibiotic. ID consulted. Follow tick borne labs, cultures. Trend fever (Tmax 101.7), WBC. Lactate wnl    Elevated LFT's:  -Likely in response to acute illness. Monitor trends    HTN:  -BP acceptable acutely. Continue  "lisinopril    Depression:  -stable; bupropion    HLD:  -hold statin for now    I discussed the patient's findings and my recommendations with patient, spouse, and nursing staff.    VTE Prophylaxis - SCDs.  Code Status - Full code.       VALERIE Weldon  [facility]  07/27/23  11:17 EDT      Electronically signed by Paula Nava APRN at 07/27/23 1118          Emergency Department Notes        Tessie Nelson, RN at 07/27/23 0013          Pt signed EMTALA and given ED Summary for handoff to [facility]. Pt ambulated with steady gait out of dept.    Electronically signed by Tessie Nelson, RN at 07/27/23 0014       Tessie Nelson RN at 07/26/23 2302          \"Nursing report ED to floor  Sanjeev Stubbs  57 y.o.  male    HPI :   Chief Complaint   Patient presents with    Speech Problem    Tremors     During triage multiple circular macular red areas over body,and extremities        Admitting doctor:   Ed Lee MD    Admitting diagnosis:   The primary encounter diagnosis was FUO (fever of unknown origin). A diagnosis of Word finding difficulty was also pertinent to this visit.    Code status:   Current Code Status       Date Active Code Status Order ID Comments User Context       Not on file            Allergies:   Patient has no known allergies.    Isolation:   No active isolations    Intake and Output    Intake/Output Summary (Last 24 hours) at 7/26/2023 2302  Last data filed at 7/26/2023 2302  Gross per 24 hour   Intake 1000 ml   Output --   Net 1000 ml       Weight:       07/26/23 1959   Weight: 103 kg (227 lb)       Most recent vitals:   Vitals:    07/26/23 1959 07/26/23 2000 07/26/23 2152   BP: 164/98  152/88   BP Location: Left arm     Patient Position: Sitting     Pulse: 98  93   Resp: 16     Temp:  98.9 °F (37.2 °C)    SpO2: 96%  97%   Weight: 103 kg (227 lb)     Height: 182.9 cm (72\")         Active LDAs/IV Access:   Lines, Drains & Airways       " Active LDAs       Name Placement date Placement time Site Days    Peripheral IV 07/26/23 2008 Right Antecubital 07/26/23 2008  Antecubital  less than 1                    Labs (abnormal labs have a star):   Labs Reviewed   COMPREHENSIVE METABOLIC PANEL - Abnormal; Notable for the following components:       Result Value    Glucose 126 (*)     Potassium 3.2 (*)     CO2 30.4 (*)     ALT (SGPT) 75 (*)     AST (SGOT) 52 (*)     Alkaline Phosphatase 197 (*)     All other components within normal limits    Narrative:     GFR Normal >60  Chronic Kidney Disease <60  Kidney Failure <15     URINALYSIS W/ CULTURE IF INDICATED - Abnormal; Notable for the following components:    Appearance, UA Slightly Cloudy (*)     Ketones, UA Trace (*)     Blood, UA Small (1+) (*)     Protein, UA 30 mg/dL (1+) (*)     All other components within normal limits    Narrative:     In absence of clinical symptoms, the presence of pyuria, bacteria, and/or nitrites on the urinalysis result does not correlate with infection.   CBC WITH AUTO DIFFERENTIAL - Abnormal; Notable for the following components:    Hemoglobin 12.8 (*)     Neutrophil % 83.9 (*)     Lymphocyte % 7.3 (*)     Immature Grans % 2.2 (*)     Neutrophils, Absolute 8.20 (*)     Immature Grans, Absolute 0.22 (*)     All other components within normal limits   RAPID STREP A SCREEN - Normal   COVID-19 AND FLU A/B, NP SWAB IN TRANSPORT MEDIA 8-12 HR TAT - Normal    Narrative:     Fact sheet for providers: https://www.fda.gov/media/996908/download    Fact sheet for patients: https://www.fda.gov/media/932784/download    Test performed by PCR.   LACTIC ACID, PLASMA - Normal   ACETAMINOPHEN LEVEL - Normal   BLOOD CULTURE   BLOOD CULTURE   RAINBOW DRAW    Narrative:     The following orders were created for panel order [place] Draw.  Procedure                               Abnormality         Status                     ---------                               -----------         ------                      Green Top (Gel)[[id number]]                                  Final result               Lavender Top[[id number]]                                     Final result               Gold Top - SST[[id number]]                                                              Light Blue Top[[id number]]                                                              Green Top (Gel)[[id number]]                                                               Please view results for these tests on the individual orders.   EHRLICHIA PROFILE, DNA PCR   RICKETTSIA SPECIES DNA, REAL TIME PCR   LYME DISEASE TOTAL ANTIBODY WITH REFLEX TO IMMUNOASSAY   URINALYSIS, MICROSCOPIC ONLY   CBC AND DIFFERENTIAL    Narrative:     The following orders were created for panel order CBC & Differential.  Procedure                               Abnormality         Status                     ---------                               -----------         ------                     CBC Auto Differential[[id number]]        Abnormal            Final result                 Please view results for these tests on the individual orders.   GREEN TOP   LAVENDER TOP   LIGHT BLUE TOP       EKG:   No orders to display       Meds given in ED:   Medications   sodium chloride 0.9 % flush 10 mL (has no administration in time range)   doxycycline (VIBRAMYCIN) 100 mg in sodium chloride 0.9 % 100 mL IVPB-VTB (has no administration in time range)   sodium chloride 0.9 % bolus 1,000 mL (0 mL Intravenous Stopped 7/26/23 2302)       Imaging results:  XR Chest 2 View    Result Date: 7/26/2023  Linear scarring versus atelectasis noted at the left lung base.  This report was finalized on 7/26/2023 9:10 PM by Dr. Betsy Grider M.D.      CT Head Without Contrast    Result Date: 7/26/2023  No acute intracranial findings.  Radiation dose reduction techniques were utilized, including automated exposure control and exposure modulation based on body size.  This report was finalized on  7/26/2023 9:08 PM by Dr. Betsy Grider M.D.       Ambulatory status:   - ad consuelo    Social issues:   Social History     Socioeconomic History    Marital status:     Number of children: 2   Tobacco Use    Smoking status: Never    Smokeless tobacco: Never   Vaping Use    Vaping Use: Never used   Substance and Sexual Activity    Alcohol use: Yes     Comment: socially    Drug use: No    Sexual activity: Defer       NIH Stroke Scale:       Tessie Nelson RN  07/26/23 23:02 EDT          Electronically signed by Tessie Nelson RN at 07/26/23 2303       Zeinab Figueroa RN at 07/26/23 2238       Summary:Accepted to [facility]                 Pt accepted to [facility]. Per ER MD, pt okay to go POV. Wife able to drive once clean bed assigned.     Electronically signed by Zeinab Figueroa RN at 07/26/23 2240       Zeinab Figueroa RN at 07/26/23 2216       Summary:2nd page                 2nd page sent to [facility] at this time. Awaiting to hear back from Dr. Lee    Electronically signed by Zeinab Figueroa RN at 07/26/23 2216       Zeinab Figueroa RN at 07/26/23 2144          Page to [facility] out at this time. Awaiting call back from Dr. Lee. ER MD made aware.     Electronically signed by Zeinab Figueroa RN at 07/26/23 2144       Shen Maciel MD at 07/26/23 2030          Subjective  History of Present Illness  Patient with about 2-week duration of what seems like daily fevers.  He also has some confusion and word finding difficulty.  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  He has a hard time grasping onto certain items.  He complains of joint pains, muscle aches and over the past couple days has up to palm sized circular red rashes which are nontender.  These are nonraised.  He denies any abdominal complaints.  No chest complaints.  No  issues.  No back or flank issues.  No trauma.  About a week before the symptoms started he was  in [place] and at the [facility] and did receive some insect bites but no particular tick bites.  He thought he may have been bitten by mosquitoes    Review of Systems    Past Medical History:   Diagnosis Date    Depression     Hypertension        No Known Allergies    Past Surgical History:   Procedure Laterality Date    ANKLE SURGERY Left     COLONOSCOPY      COLONOSCOPY N/A 4/19/2023    Procedure: COLONOSCOPY FOR SCREENING;  Surgeon: Dax Colon MD;  Location: [facility] OR;  Service: Gastroenterology;  Laterality: N/A;  polyps    ENDOSCOPY N/A 7/5/2023    Procedure: ESOPHAGOGASTRODUODENOSCOPY with possible esophageal dilation;  Surgeon: Dax Colon MD;  [place] [facility] [place];  Service: Gastroenterology;  Laterality: N/A;  gastric polyps, dilated to 15 mm, esophagitis and stricture       Family History   Problem Relation Age of Onset    Diverticulitis Mother     Migraines Father     Colon cancer Neg Hx     Colon polyps Neg Hx     Irritable bowel syndrome Neg Hx     Ulcerative colitis Neg Hx     Crohn's disease Neg Hx        Social History     Socioeconomic History    Marital status:     Number of children: 2   Tobacco Use    Smoking status: Never    Smokeless tobacco: Never   Vaping Use    Vaping Use: Never used   Substance and Sexual Activity    Alcohol use: Yes     Comment: socially    Drug use: No    Sexual activity: Defer           Objective  Physical Exam  Vitals and nursing note reviewed.   Constitutional:       Appearance: Normal appearance.   HENT:      Head: Normocephalic.      Right Ear: External ear normal.      Left Ear: External ear normal.      Nose: Nose normal.      Mouth/Throat:      Mouth: Mucous membranes are moist.      Pharynx: No oropharyngeal exudate or posterior oropharyngeal erythema.   Eyes:      Conjunctiva/sclera: Conjunctivae normal.   Cardiovascular:      Rate and Rhythm: Normal rate.   Pulmonary:      Effort: Pulmonary effort is normal.   Abdominal:       General: There is no distension.   Musculoskeletal:         General: No tenderness.      Cervical back: Normal range of motion.   Skin:     Findings: Rash (Nontender, flat, nonraised macules throughout body.  These are no larger than palm size.  Located through extremities and back) present.   Neurological:      Mental Status: He is alert and oriented to person, place, and time.   Psychiatric:         Mood and Affect: Mood normal.       Procedures           ED Course  ED Course as of 07/26/23 2231 Wed Jul 26, 2023 2156 Pt and wife would like to be admitted vs going home and trying to see team in outpt. [FK]      ED Course User Index  [FK] Shen Maciel MD                                           Medical Decision Making  Case discussed with hospitalist team who will graciously admit the patient.  Dr. Lee will be accepting physician.  We will give patient dose of IV doxycycline at this time    I think patient would benefit from seeing infectious disease while in the hospital.    Problems Addressed:  FUO (fever of unknown origin): complicated acute illness or injury  Word finding difficulty: complicated acute illness or injury    Amount and/or Complexity of Data Reviewed  Labs: ordered.  Radiology: ordered.    Risk  Prescription drug management.  Decision regarding hospitalization.        Final diagnoses:   FUO (fever of unknown origin)   Word finding difficulty       ED Disposition  ED Disposition       ED Disposition   Decision to Admit    Condition   --    Comment   Level of Care: Med/Surg [1]   Diagnosis: FUO (fever of unknown origin) [195781]   Admitting Physician: TAMAR LEE [[id number]]   Attending Physician: TAMAR LEE [[id number]]   Isolate for COVID?: No [0]   Certification: I Certify That Inpatient Hospital Services Are Medically Necessary For Greater Than 2 Midnights                 No follow-up provider specified.       Medication List      No changes were made to your prescriptions during this  "visit.           Electronically signed by Shen Maciel MD at 07/26/23 2232       Tessie Nelson, RN at 07/26/23 2021          Pt presents to ED with c/o fever x 1 week, muscle aches, joint pain, tremors and difficulty finding words. Pt reports 2 weeks ago traveling to [place] sand [place]unes and hiking around beach, had lots of insect bites, but never saw what bit him. Pt has erythema rash to truck and extremities that has been there a few days. Pt speech is clear and logical at this time.    Electronically signed by Tessie Nelson RN at 07/26/23 2206       Physician Progress Notes (last 48 hours)  Notes from 07/25/23 1253 through 07/27/23 1253   No notes of this type exist for this encounter.          Consult Notes (last 48 hours)        Galdino Shafer MD at 07/27/23 1021        Consult Orders    1. Inpatient Infectious Diseases Consult [[id number]] ordered by Paula Nava APRN at 07/27/23 0829                 Referring Provider: Dr Mulligan    Reason for Consultation: fever and rash    History of present illness:  Sanjeev Stubbs is a 57 y.o. who I am asked to evaluate and give opinion for \"fever and rash.\" History is obtained from the patient, his wife, and review of the old medical records which I summarize/synthesize as follows: He presented to the ER on 7/26/23 (yesterday) with about two weeks of fever, body aches, joint pains, patchy non-pruritic rash, word finding difficulty, and tremors. He's had some occasional loose stools and neck stiffness.  He initially went to urgent care and tested negative and was recommended to treat his symptoms w/ OTC medications. However, his symptoms progressed so he came in yesterday for evaluation.     Just prior to onset of symptoms, he had been doing some hiking in [place] on the sand d[place] near [facility] and in some swampy areas. He had multiple bug bites. No history of serious or unusual infections as an adult. He works as a middle school " teacher. No contact w/ young/sick children. No other travel besides [place] other than going to visit his son in [place] a year ago. No hardware in the body. He did start a PPI about a week prior to symptom onset. No recent mouth pain or dental work.     In the ER, he was initially febrile but this morning had a fever to 101.7 F. Labs notable for WBC 9, Plt 362, ALT 75, AST 52, and a UA negative for LE and nitrites. CT head and CXR didn't show any acute process. He was started on IV doxycycline. ID asked to evaluate.     Past Medical History:   Diagnosis Date    Depression     Hypertension        Past Surgical History:   Procedure Laterality Date    ANKLE SURGERY Left     COLONOSCOPY      COLONOSCOPY N/A 4/19/2023    Procedure: COLONOSCOPY FOR SCREENING;  Surgeon: Dax Colon MD;  Location: [facility] [place] [facility];  Service: Gastroenterology;  Laterality: N/A;  polyps    ENDOSCOPY N/A 7/5/2023    Procedure: ESOPHAGOGASTRODUODENOSCOPY with possible esophageal dilation;  Surgeon: Dax Colon MD;  Location: [facility] [place] [facility];  Service: Gastroenterology;  Laterality: N/A;  gastric polyps, dilated to 15 mm, esophagitis and stricture       Social History:  Lives in [place], [place]  Works for a[employer] as a     Antibiotic allergies and intolerances:  None    Medications:    Current Facility-Administered Medications:     acetaminophen (TYLENOL) tablet 650 mg, 650 mg, Oral, Q4H PRN, 650 mg at 07/27/23 0905 **OR** acetaminophen (TYLENOL) 160 MG/5ML solution 650 mg, 650 mg, Oral, Q4H PRN **OR** acetaminophen (TYLENOL) suppository 650 mg, 650 mg, Rectal, Q4H PRN, Chely Gardner APRN    atorvastatin (LIPITOR) tablet 20 mg, 20 mg, Oral, Daily, Chely Gardner APRN, 20 mg at 07/27/23 0901    sennosides-docusate (PERICOLACE) 8.6-50 MG per tablet 2 tablet, 2 tablet, Oral, BID **AND** polyethylene glycol (MIRALAX) packet 17 g, 17 g, Oral, Daily PRN **AND** bisacodyl (DULCOLAX) EC tablet 5 mg, 5 mg,  Oral, Daily PRN **AND** bisacodyl (DULCOLAX) suppository 10 mg, 10 mg, Rectal, Daily PRN, Chely Gardner APRN    buPROPion XL (WELLBUTRIN XL) 24 hr tablet 150 mg, 150 mg, Oral, Daily, Chely Gardner APRN, 150 mg at 07/27/23 0901    Calcium Replacement - Follow Nurse / BPA Driven Protocol, , Does not apply, PRN, Chely Gardner APRN    doxycycline (VIBRAMYCIN) 100 mg in sodium chloride 0.9 % 100 mL IVPB-VTB, 100 mg, Intravenous, Q12H, Paula Nava APRN    lisinopril (PRINIVIL,ZESTRIL) tablet 20 mg, 20 mg, Oral, Daily, Chely Gardner APRN, 20 mg at 07/27/23 0901    Magnesium Standard Dose Replacement - Follow Nurse / BPA Driven Protocol, , Does not apply, PRN, Chely Gardner APRN    pantoprazole (PROTONIX) EC tablet 40 mg, 40 mg, Oral, Q AM, Chely Gardner APRN, 40 mg at 07/27/23 0606    Phosphorus Replacement - Follow Nurse / BPA Driven Protocol, , Does not apply, PRN, Chely Gardner APRN    Potassium Replacement - Follow Nurse / BPA Driven Protocol, , Does not apply, PRN, Chely Gardner APRN    sertraline (ZOLOFT) tablet 100 mg, 100 mg, Oral, Daily, Chely Gardner APRN, 100 mg at 07/27/23 0901    sodium chloride 0.9 % flush 10 mL, 10 mL, Intravenous, PRN, Shen Maciel MD    sodium chloride 0.9 % flush 10 mL, 10 mL, Intravenous, Q12H, Chely Gardner APRN, 10 mL at 07/27/23 0900    sodium chloride 0.9 % flush 10 mL, 10 mL, Intravenous, PRN, Chely Gardner APRN    sodium chloride 0.9 % infusion 40 mL, 40 mL, Intravenous, PRN, Chely Gardner APRN      Objective   Vital Signs   Temp:  [98.5 °F (36.9 °C)-101.7 °F (38.7 °C)] 98.7 °F (37.1 °C)  Heart Rate:  [85-98] 91  Resp:  [16] 16  BP: (129-164)/(70-98) 147/84    Physical Exam:   General: awake, alert, very nice, mildly anxious   Eyes: no scleral icterus  ENT: no thrush  Neck: supple w/o meningismus (he says this has improved)  Cardiovascular: NR;  no murmur  Respiratory: normal work of breathing on ambient air  GI: Abdomen is soft, not tender, + bowel sounds in all four quadrants  :  no Presley catheter  Skin: flat non pruritic rash scattered around entire body  Neurological: Alert and oriented x 3  Psychiatric: Normal mood and affect   Vasc: PIV w/o erythema    Labs:     Lab Results   Component Value Date    WBC 11.95 (H) 07/27/2023    HGB 12.8 (L) 07/27/2023    HCT 38.9 07/27/2023    MCV 89.4 07/27/2023     07/27/2023       Lab Results   Component Value Date    GLUCOSE 106 (H) 07/27/2023    BUN 11 07/27/2023    CREATININE 0.82 07/27/2023    EGFRIFNONA 90 09/11/2019    EGFRIFAFRI 97 11/08/2016    BCR 13.4 07/27/2023    CO2 27.9 07/27/2023    CALCIUM 8.5 (L) 07/27/2023    PROTENTOTREF 6.3 03/10/2023    ALBUMIN 3.3 (L) 07/27/2023    LABIL2 2.2 03/10/2023    AST 45 (H) 07/27/2023    ALT 65 (H) 07/27/2023     Lyme Ab pending  Ehrlichia PCR pending  Rickettsia PCR pending  WNV IgM and IgG ordered  RPR ordered  LA 0.8  UA negative LE and nitrite    Microbiology:  7/26 Strep A PCR: negative  7/26 COVD/Flu/RSV PCR: negative  7/26 BCx: pending    Radiology:  CT head no acute process  CXR with atelectasis in the LLL; no pneumonia on my read    ASSESSMENT/PLAN:  Fever in adult  Painful, non-pruritic rash  Elevated liver enzymes  Bug bites  Headache and previous neck stiffness    I think an infectious or inflammatory disease (ie autoimmune illness) is the most likely cause of his symptoms. The differential is wide so will send a broad workup. His rash has some characteristics of erythema nodosum; however, it is in more places than just the shins where it is typically seen.     I will follow-up his tests for Ehrlichia, Rickettsial infection, and Lyme (less likely). I will continue empiric doxycycline. Normal/elevated WBC and normal platelets would argue against tick-borne illness but still possible.     Check WNG IgM and IgG, RPR, SALEEM, ANCA, ACE level, ferritin,  ESR, CRP, Histo studies, Mycoplasma, HIV Ab, and an RPP. Given fever, headache and neck stiffness, will also get an LP as something like Enterovirus could be the culprit especially w/ his GI symptoms.     ID will follow. Case and its management discussed with Dr Mulligan.       Electronically signed by Galdino Shafer MD at 07/27/23 5339          All medication doses during the admission are shown, including meds that are no longer on order.  Scheduled Meds Sorted by Name  for Sanjeev Stubbs as of 7/27/23 through 7/27/23    1 Day 3 Days 7 Days 10 Days < Today >   Legend:       Medications 07/27/23   atorvastatin (LIPITOR) tablet 20 mg  Dose: 20 mg  Freq: Daily Route: PO  Start: 07/27/23 0900 End: 07/27/23 1117   Admin Instructions:       0901   1117-D/C'd              buPROPion XL (WELLBUTRIN XL) 24 hr tablet 150 mg  Dose: 150 mg  Freq: Daily Route: PO  Start: 07/27/23 0900   Admin Instructions:       0901                 doxycycline (VIBRAMYCIN) 100 mg in sodium chloride 0.9 % 100 mL IVPB-VTB  Dose: 100 mg  Freq: Every 12 Hours Route: IV  Indications of Use: BACTEREMIA  Start: 07/27/23 1100 End: 08/01/23 1059   Admin Instructions:       1052   2300                doxycycline (VIBRAMYCIN) 100 mg in sodium chloride 0.9 % 100 mL IVPB-VTB  Dose: 100 mg  Freq: Once Route: IV  Indications of Use: BACTEREMIA  Last Dose: Stopped (07/26/23 2359)  Start: 07/26/23 2300 End: 07/26/23 2359   Admin Instructions:          lisinopril (PRINIVIL,ZESTRIL) tablet 20 mg  Dose: 20 mg  Freq: Daily Route: PO  Start: 07/27/23 0900   Admin Instructions:       0901                 pantoprazole (PROTONIX) EC tablet 40 mg  Dose: 40 mg  Freq: Every Early Morning Route: PO  Start: 07/27/23 0600   Admin Instructions:       0606                 potassium chloride (K-DUR,KLOR-CON) ER tablet 40 mEq  Dose: 40 mEq  Freq: Every 4 Hours Route: PO  Start: 07/27/23 0300 End: 07/27/23 0606   Admin Instructions:       0211   0606                  sennosides-docusate (PERICOLACE) 8.6-50 MG per tablet 2 tablet  Dose: 2 tablet  Freq: 2 Times Daily Route: PO  Start: 07/27/23 0900   Admin Instructions:       (0902)   2100                And  polyethylene glycol (MIRALAX) packet 17 g  Dose: 17 g  Freq: Daily PRN Route: PO  PRN Reason: Constipation  PRN Comment: Use if senna-docusate is ineffective  Start: 07/27/23 0117   Admin Instructions:          And  bisacodyl (DULCOLAX) EC tablet 5 mg  Dose: 5 mg  Freq: Daily PRN Route: PO  PRN Reason: Constipation  PRN Comment: Use if polyethylene glycol is ineffective  Start: 07/27/23 0117   Admin Instructions:          And  bisacodyl (DULCOLAX) suppository 10 mg  Dose: 10 mg  Freq: Daily PRN Route: RE  PRN Reason: Constipation  PRN Comment: Use if bisacodyl oral is ineffective  Start: 07/27/23 0117   Admin Instructions:          sertraline (ZOLOFT) tablet 100 mg  Dose: 100 mg  Freq: Daily Route: PO  Start: 07/27/23 0900    0901                 sodium chloride 0.9 % bolus 1,000 mL  Dose: 1,000 mL  Freq: Once Route: IV  Last Dose: Stopped (07/26/23 2302)  Start: 07/26/23 2045 End: 07/26/23 2302       sodium chloride 0.9 % flush 10 mL  Dose: 10 mL  Freq: Every 12 Hours Scheduled Route: IV  Start: 07/27/23 0215    0140   0900   2100               Medications 07/27/23         Continuous Meds Sorted by Name  for Sanjeev Stubbs as of 7/27/23 through 7/27/23  Legend:       Medications 07/27/23   Hold medication  Dose: 1 each  Freq: Continuous PRN Route: XX  PRN Comment: Lumbar Puncture  Start: 07/27/23 1157   Order specific questions:                PRN Meds Sorted by Name  for Sanjeev Stubbs as of 7/27/23 through 7/27/23  Legend:       Medications 07/27/23    acetaminophen (TYLENOL) tablet 650 mg  Dose: 650 mg  Freq: Every 4 Hours PRN Route: PO  PRN Reason: Mild Pain  Start: 07/27/23 0119   Admin Instructions:       0140   0905                Or  acetaminophen (TYLENOL) 160 MG/5ML solution 650 mg  Dose: 650 mg  Freq: Every 4  Hours PRN Route: PO  PRN Reasons: Mild Pain,Headache,Fever  Start: 07/27/23 0119   Admin Instructions:                          Or  acetaminophen (TYLENOL) suppository 650 mg  Dose: 650 mg  Freq: Every 4 Hours PRN Route: RE  PRN Reasons: Mild Pain,Fever  Start: 07/27/23 0119   Admin Instructions:                           sennosides-docusate (PERICOLACE) 8.6-50 MG per tablet 2 tablet  Dose: 2 tablet  Freq: 2 Times Daily Route: PO  Start: 07/27/23 0900   Admin Instructions:       (0902)   2100                And  polyethylene glycol (MIRALAX) packet 17 g  Dose: 17 g  Freq: Daily PRN Route: PO  PRN Reason: Constipation  PRN Comment: Use if senna-docusate is ineffective  Start: 07/27/23 0117   Admin Instructions:          And  bisacodyl (DULCOLAX) EC tablet 5 mg  Dose: 5 mg  Freq: Daily PRN Route: PO  PRN Reason: Constipation  PRN Comment: Use if polyethylene glycol is ineffective  Start: 07/27/23 0117   Admin Instructions:          And  bisacodyl (DULCOLAX) suppository 10 mg  Dose: 10 mg  Freq: Daily PRN Route: RE  PRN Reason: Constipation  PRN Comment: Use if bisacodyl oral is ineffective  Start: 07/27/23 0117   Admin Instructions:          Calcium Replacement - Follow Nurse / BPA Driven Protocol  Freq: As Needed Route: XX  PRN Reason: Other  Start: 07/27/23 0119   Admin Instructions:          Hold medication  Dose: 1 each  Freq: Continuous PRN Route: XX  PRN Comment: Lumbar Puncture  Start: 07/27/23 1157   Order specific questions:          Magnesium Standard Dose Replacement - Follow Nurse / BPA Driven Protocol  Freq: As Needed Route: XX  PRN Reason: Other  Start: 07/27/23 0119   Admin Instructions:          nitroglycerin (NITROSTAT) SL tablet 0.4 mg  Dose: 0.4 mg  Freq: Every 5 Minutes PRN Route: SL  PRN Reason: Chest Pain  PRN Comment: Only if SBP Greater Than 100  Start: 07/27/23 0117 End: 07/27/23 0137   Admin Instructions:       0137-D/C'd               Phosphorus Replacement - Follow Nurse / BPA Driven  Protocol  Freq: As Needed Route: XX  PRN Reason: Other  Start: 07/27/23 0119   Admin Instructions:          Potassium Replacement - Follow Nurse / BPA Driven Protocol  Freq: As Needed Route: XX  PRN Reason: Other  Start: 07/27/23 0119   Admin Instructions:          sodium chloride 0.9 % flush 10 mL  Dose: 10 mL  Freq: As Needed Route: IV  PRN Reason: Line Care  Start: 07/27/23 0117       sodium chloride 0.9 % flush 10 mL  Dose: 10 mL  Freq: As Needed Route: IV  PRN Reason: Line Care  Start: 07/26/23 2006       sodium chloride 0.9 % infusion 40 mL  Dose: 40 mL  Freq: As Needed Route: IV  PRN Reason: Line Care  Start: 07/27/23 0117   Admin Instructions:          Medications 07/27/23

## 2023-07-27 NOTE — PLAN OF CARE
Goal Outcome Evaluation:           Progress: no change  Outcome Evaluation: Pt rested throughout the shift, A&Ox4, and able to make needs known. IV antibiotics given per orders. Tylenol given x2 for fever with good effect, other vital signs WDL. Lumbar puncture tomorrow. Wife at the bedside, updated on care plan and they both verbalized understanding. Will continue to monitor.

## 2023-07-27 NOTE — H&P
Patient Name:  Sanjeev Stubbs  YOB: 1965  MRN:  [id number]  Admit Date:  7/26/2023  Patient Care Team:  Harshil Garcia MD as PCP - General (Family Medicine)  Giselle Meng APRN as Nurse Practitioner (Gastroenterology)      Subjective   History Present Illness     Chief Complaint   Patient presents with    Speech Problem    Tremors     During triage multiple circular macular red areas over body,and extremities        Mr. Stubbs is a 57 y.o. non-smoker with a history of HTN, depression that presents to [facility] complaining of fevers, rash, tremors, speech difficulty. Traveled a few weeks ago that involved hiking. A few days after returning home, he started w/fevers and myalgias. He was seen at  on 7/15/23, thought to have a viral illness w/recommendation to treat symptoms. Since then he has had nearly daily fevers and over the past 2-3 days has had tremors that make writing difficult and has had word finding difficulty, noticed by colleagues. He has had frequent headaches. He was having neck and jaw pain that has improved. Denies sore throat, cough, runny nose, soa, chest pain, n/v/d, dysuria, impaired gait/balance. He has also developed a red circular rash on all extremities and trunk; denies itching, pain.    Afebrile. HR controlled. BP stable. On room air. WBC 9.78, Hgb 12.8. Tick borne labs pending. Lactic 0.8. BC collected. Gluc 126, K 3.2, CO2 30.4, ALT/AST 75/52, Alk phos 197. Strep A neg. UA neg. Covid/Influenza neg. CTH neg. CXR: scarring vs atelectasis      Review of Systems   Constitutional:  Positive for appetite change, fatigue and fever.   HENT:  Negative for congestion.    Respiratory:  Negative for cough and shortness of breath.    Cardiovascular:  Negative for chest pain.   Gastrointestinal:  Negative for diarrhea, nausea and vomiting.   Genitourinary:  Negative for dysuria.   Musculoskeletal:  Positive for arthralgias, myalgias and neck pain.   Skin:   Positive for rash.   Neurological:  Positive for tremors, speech difficulty and headaches.   Psychiatric/Behavioral:  Negative for sleep disturbance.       Personal History     Past Medical History:   Diagnosis Date    Depression     Hypertension      Past Surgical History:   Procedure Laterality Date    ANKLE SURGERY Left     COLONOSCOPY      COLONOSCOPY N/A 4/19/2023    Procedure: COLONOSCOPY FOR SCREENING;  Surgeon: Dax Colon MD;  Location: [facility] MAIN OR;  Service: Gastroenterology;  Laterality: N/A;  polyps    ENDOSCOPY N/A 7/5/2023    Procedure: ESOPHAGOGASTRODUODENOSCOPY with possible esophageal dilation;  Surgeon: Dax Colon MD;  Location: [facility] MAIN OR;  Service: Gastroenterology;  Laterality: N/A;  gastric polyps, dilated to 15 mm, esophagitis and stricture     Family History   Problem Relation Age of Onset    Diverticulitis Mother     Migraines Father     Colon cancer Neg Hx     Colon polyps Neg Hx     Irritable bowel syndrome Neg Hx     Ulcerative colitis Neg Hx     Crohn's disease Neg Hx      Social History     Tobacco Use    Smoking status: Never    Smokeless tobacco: Never   Vaping Use    Vaping Use: Never used   Substance Use Topics    Alcohol use: Yes     Comment: socially    Drug use: No     No current facility-administered medications on file prior to encounter.     Current Outpatient Medications on File Prior to Encounter   Medication Sig Dispense Refill    acetaminophen (TYLENOL) 325 MG tablet Take 2 tablets by mouth Every 6 (Six) Hours As Needed for Mild Pain.      atorvastatin (LIPITOR) 20 MG tablet Take 1 tablet by mouth Daily. 90 tablet 3    buPROPion XL (WELLBUTRIN XL) 150 MG 24 hr tablet Take 1 tablet by mouth Every Morning. 90 tablet 3    lisinopril (PRINIVIL,ZESTRIL) 20 MG tablet Take 1 tablet by mouth Daily. 90 tablet 1    omeprazole (priLOSEC) 40 MG capsule Take 1 capsule by mouth 2 (Two) Times a Day. 60 capsule 5    sertraline (ZOLOFT) 100 MG tablet Take 1 tablet  by mouth Daily. 90 tablet 3     No Known Allergies    Objective    Objective     Vital Signs  Temp:  [98.5 °F (36.9 °C)-101.7 °F (38.7 °C)] 98.7 °F (37.1 °C)  Heart Rate:  [85-98] 91  Resp:  [16] 16  BP: (129-164)/(70-98) 147/84  SpO2:  [94 %-97 %] 94 %  on   ;   Device (Oxygen Therapy): room air  Body mass index is 31.13 kg/m².    Physical Exam  Vitals and nursing note reviewed.   Constitutional:       General: He is not in acute distress.     Appearance: He is ill-appearing. He is not toxic-appearing.      Comments: Fatigued, weak appearance   HENT:      Head: Normocephalic.      Mouth/Throat:      Mouth: Mucous membranes are moist.   Eyes:      Conjunctiva/sclera: Conjunctivae normal.   Cardiovascular:      Rate and Rhythm: Normal rate and regular rhythm.   Pulmonary:      Effort: Pulmonary effort is normal. No respiratory distress.      Breath sounds: No wheezing or rales.   Abdominal:      General: Bowel sounds are normal.      Palpations: Abdomen is soft.   Musculoskeletal:      Cervical back: Neck supple.      Right lower leg: No edema.      Left lower leg: No edema.   Skin:     General: Skin is warm and dry.      Findings: Rash present.      Comments: Circular erythematous rash on all extremities and trunk; flat; not blistered or scaly   Neurological:      Mental Status: He is alert and oriented to person, place, and time.   Psychiatric:         Mood and Affect: Mood normal.         Behavior: Behavior normal.       Results Review:  I reviewed the patient's new clinical results.  I reviewed the patient's new imaging results and agree with the interpretation.  I reviewed the patient's other test results and agree with the interpretation  I personally viewed and interpreted the patient's EKG/Telemetry data    Lab Results (last 24 hours)       Procedure Component Value Units Date/Time    CBC & Differential [[id number]]  (Abnormal) Collected: 07/26/23 2016    Specimen: Blood Updated: 07/26/23 2020    Narrative:       The following orders were created for panel order CBC & Differential.  Procedure                               Abnormality         Status                     ---------                               -----------         ------                     CBC Auto Differential[[id number]]        Abnormal            Final result                 Please view results for these tests on the individual orders.    Comprehensive Metabolic Panel [[id number]]  (Abnormal) Collected: 07/26/23 2016    Specimen: Blood Updated: 07/26/23 2041     Glucose 126 mg/dL      BUN 18 mg/dL      Creatinine 0.81 mg/dL      Sodium 138 mmol/L      Potassium 3.2 mmol/L      Chloride 100 mmol/L      CO2 30.4 mmol/L      Calcium 9.1 mg/dL      Total Protein 6.6 g/dL      Albumin 3.8 g/dL      ALT (SGPT) 75 U/L      AST (SGOT) 52 U/L      Alkaline Phosphatase 197 U/L      Total Bilirubin 0.6 mg/dL      Globulin 2.8 gm/dL      A/G Ratio 1.4 g/dL      BUN/Creatinine Ratio 22.2     Anion Gap 7.6 mmol/L      eGFR 104.1 mL/min/1.73     Narrative:      GFR Normal >60  Chronic Kidney Disease <60  Kidney Failure <15      Lyme Disease Total Antibody With Reflex to Immunoassay [[id number]] Collected: 07/26/23 2016    Specimen: Blood Updated: 07/26/23 2016    CBC Auto Differential [[id number]]  (Abnormal) Collected: 07/26/23 2016    Specimen: Blood Updated: 07/26/23 2020     WBC 9.78 10*3/mm3      RBC 4.40 10*6/mm3      Hemoglobin 12.8 g/dL      Hematocrit 39.7 %      MCV 90.2 fL      MCH 29.1 pg      MCHC 32.2 g/dL      RDW 13.0 %      RDW-SD 44.3 fl      MPV 9.3 fL      Platelets 362 10*3/mm3      Neutrophil % 83.9 %      Lymphocyte % 7.3 %      Monocyte % 5.1 %      Eosinophil % 1.3 %      Basophil % 0.2 %      Immature Grans % 2.2 %      Neutrophils, Absolute 8.20 10*3/mm3      Lymphocytes, Absolute 0.71 10*3/mm3      Monocytes, Absolute 0.50 10*3/mm3      Eosinophils, Absolute 0.13 10*3/mm3      Basophils, Absolute 0.02 10*3/mm3      Immature Grans, Absolute 0.22  10*3/mm3     Lactic Acid, Plasma [[id number]]  (Normal) Collected: 07/26/23 2017    Specimen: Blood Updated: 07/26/23 2035     Lactate 0.8 mmol/L     Blood Culture - Blood, Arm, Right [[id number]] Collected: 07/26/23 2022    Specimen: Blood from Arm, Right Updated: 07/26/23 2039    Ehrlichia Profile DNA PCR [[id number]] Collected: 07/26/23 2022    Specimen: Blood Updated: 07/26/23 2023    Blood Culture - Blood, Arm, Right [[id number]] Collected: 07/26/23 2029    Specimen: Blood from Arm, Right Updated: 07/26/23 2038    Rapid Strep A Screen - Swab, Throat [[id number]]  (Normal) Collected: 07/26/23 2029    Specimen: Swab from Throat Updated: 07/26/23 2117     STREP A PCR Not Detected    Rickettsia Species DNA, Real-Time PCR [[id number]] Collected: 07/26/23 2029    Specimen: Blood Updated: 07/26/23 2039    Urinalysis With Culture If Indicated - Urine, Clean Catch [[id number]]  (Abnormal) Collected: 07/26/23 2033    Specimen: Urine, Clean Catch Updated: 07/26/23 2058     Color, UA Yellow     Appearance, UA Slightly Cloudy     pH, UA 6.0     Specific Gravity, UA 1.025     Glucose, UA Negative     Ketones, UA Trace     Bilirubin, UA Negative     Blood, UA Small (1+)     Protein, UA 30 mg/dL (1+)     Leuk Esterase, UA Negative     Nitrite, UA Negative     Urobilinogen, UA 0.2 E.U./dL    Narrative:      In absence of clinical symptoms, the presence of pyuria, bacteria, and/or nitrites on the urinalysis result does not correlate with infection.    Urinalysis, Microscopic Only - Urine, Clean Catch [[id number]] Collected: 07/26/23 2033    Specimen: Urine, Clean Catch Updated: 07/26/23 2058    COVID-19 and FLU A/B PCR - Swab, Nasopharynx [[id number]]  (Normal) Collected: 07/26/23 2043    Specimen: Swab from Nasopharynx Updated: 07/26/23 2113     COVID19 Not Detected     Influenza A PCR Not Detected     Influenza B PCR Not Detected    Narrative:      Fact sheet for providers: https://www.fda.gov/media/626106/download    Fact sheet for  patients: https://www.fda.gov/media/990487/download    Test performed by PCR.    Acetaminophen Level [[id number]]  (Normal) Collected: 07/26/23 2200    Specimen: Blood Updated: 07/26/23 2214     Acetaminophen <5.0 mcg/mL     Basic Metabolic Panel [[id number]]  (Abnormal) Collected: 07/27/23 0610    Specimen: Blood Updated: 07/27/23 0712     Glucose 106 mg/dL      BUN 11 mg/dL      Creatinine 0.82 mg/dL      Sodium 141 mmol/L      Potassium 3.8 mmol/L      Chloride 105 mmol/L      CO2 27.9 mmol/L      Calcium 8.5 mg/dL      BUN/Creatinine Ratio 13.4     Anion Gap 8.1 mmol/L      eGFR 103.7 mL/min/1.73     Narrative:      GFR Normal >60  Chronic Kidney Disease <60  Kidney Failure <15      CBC (No Diff) [[id number]]  (Abnormal) Collected: 07/27/23 0610    Specimen: Blood Updated: 07/27/23 0650     WBC 11.95 10*3/mm3      RBC 4.35 10*6/mm3      Hemoglobin 12.8 g/dL      Hematocrit 38.9 %      MCV 89.4 fL      MCH 29.4 pg      MCHC 32.9 g/dL      RDW 12.9 %      RDW-SD 42.6 fl      MPV 9.3 fL      Platelets 330 10*3/mm3     Hepatic Function Panel [[id number]]  (Abnormal) Collected: 07/27/23 0610    Specimen: Blood Updated: 07/27/23 0854     Total Protein 6.1 g/dL      Albumin 3.3 g/dL      ALT (SGPT) 65 U/L      AST (SGOT) 45 U/L      Alkaline Phosphatase 160 U/L      Total Bilirubin 0.7 mg/dL      Bilirubin, Direct 0.3 mg/dL      Bilirubin, Indirect 0.4 mg/dL     Potassium [[id number]] Collected: 07/27/23 1042    Specimen: Blood Updated: 07/27/23 1102    West Nile Antibodies, IgG & IgM [[id number]] Collected: 07/27/23 1042    Specimen: Blood Updated: 07/27/23 1102    RPR [[id number]] Collected: 07/27/23 1042    Specimen: Blood Updated: 07/27/23 1103            Imaging Results (Last 24 Hours)       Procedure Component Value Units Date/Time    XR Chest 2 View [[id number]] Collected: 07/26/23 2109     Updated: 07/26/23 2113    Narrative:      PA AND LATERAL CHEST RADIOGRAPH     HISTORY: Tremors. Difficulty with speech.      COMPARISON: None available.     FINDINGS:  Heart size is within normal limits. No pneumothorax, pleural effusion,  or acute infiltrate is seen. There is some linear scarring versus  atelectasis noted at the left lung base.       Impression:      Linear scarring versus atelectasis noted at the left lung base.     This report was finalized on 7/26/2023 9:10 PM by Dr. Betsy Grider M.D.       CT Head Without Contrast [[id number]] Collected: 07/26/23 2104     Updated: 07/26/23 2111    Narrative:      CT HEAD WITHOUT CONTRAST     HISTORY: Confusion     COMPARISON: None available.     TECHNIQUE: Axial CT imaging was obtained through the brain. No IV  contrast was administered.     FINDINGS:  No acute intracranial hemorrhage is seen. Ventricles are normal in size.  There is no midline shift or mass effect. There is some periventricular  and deep white matter microangiopathic change. Mucous retention cyst is  noted within the left maxillary sinus. Left mastoid air cells are  relatively underpneumatized, which may reflect changes of chronic  mastoiditis/otitis media.       Impression:      No acute intracranial findings.     Radiation dose reduction techniques were utilized, including automated  exposure control and exposure modulation based on body size.     This report was finalized on 7/26/2023 9:08 PM by Dr. Betsy Grider M.D.                   No orders to display        Assessment/Plan     Active Hospital Problems    Diagnosis  POA    **FUO (fever of unknown origin) [R50.9]  Yes    Rash [R21]  Yes    Elevated LFTs [R79.89]  Yes    Mixed hyperlipidemia [E78.2]  Yes    Essential hypertension [I10]  Yes    Depression [F32.A]  Yes      Resolved Hospital Problems   No resolved problems to display.       Mr. Stubbs is a 57 y.o. non-smoker with a history of HTN, depression who is admitted for fever of unknown origin, rash     Fever/Rash:  -CTH negative to explain tremors/word finding difficulty. No neuro deficits  on exam. Continue antibiotic. ID consulted. Follow tick borne labs, cultures. Trend fever (Tmax 101.7), WBC. Lactate wnl    Elevated LFT's:  -Likely in response to acute illness. Monitor trends    HTN:  -BP acceptable acutely. Continue lisinopril    Depression:  -stable; bupropion    HLD:  -hold statin for now    I discussed the patient's findings and my recommendations with patient, spouse, and nursing staff.    VTE Prophylaxis - SCDs.  Code Status - Full code.       VALERIE Weldon  [facility]  07/27/23  11:17 EDT

## 2023-07-27 NOTE — ED NOTES
"\"Nursing report ED to floor  Sanjeev Stubbs  57 y.o.  male    HPI :   Chief Complaint   Patient presents with    Speech Problem    Tremors     During triage multiple circular macular red areas over body,and extremities        Admitting doctor:   Ed Lee MD    Admitting diagnosis:   The primary encounter diagnosis was FUO (fever of unknown origin). A diagnosis of Word finding difficulty was also pertinent to this visit.    Code status:   Current Code Status       Date Active Code Status Order ID Comments User Context       Not on file            Allergies:   Patient has no known allergies.    Isolation:   No active isolations    Intake and Output    Intake/Output Summary (Last 24 hours) at 7/26/2023 2302  Last data filed at 7/26/2023 2302  Gross per 24 hour   Intake 1000 ml   Output --   Net 1000 ml       Weight:       07/26/23 1959   Weight: 103 kg (227 lb)       Most recent vitals:   Vitals:    07/26/23 1959 07/26/23 2000 07/26/23 2152   BP: 164/98  152/88   BP Location: Left arm     Patient Position: Sitting     Pulse: 98  93   Resp: 16     Temp:  98.9 °F (37.2 °C)    SpO2: 96%  97%   Weight: 103 kg (227 lb)     Height: 182.9 cm (72\")         Active LDAs/IV Access:   Lines, Drains & Airways       Active LDAs       Name Placement date Placement time Site Days    Peripheral IV 07/26/23 2008 Right Antecubital 07/26/23 2008  Antecubital  less than 1                    Labs (abnormal labs have a star):   Labs Reviewed   COMPREHENSIVE METABOLIC PANEL - Abnormal; Notable for the following components:       Result Value    Glucose 126 (*)     Potassium 3.2 (*)     CO2 30.4 (*)     ALT (SGPT) 75 (*)     AST (SGOT) 52 (*)     Alkaline Phosphatase 197 (*)     All other components within normal limits    Narrative:     GFR Normal >60  Chronic Kidney Disease <60  Kidney Failure <15     URINALYSIS W/ CULTURE IF INDICATED - Abnormal; Notable for the following components:    Appearance, UA Slightly Cloudy (*)     Ketones, " UA Trace (*)     Blood, UA Small (1+) (*)     Protein, UA 30 mg/dL (1+) (*)     All other components within normal limits    Narrative:     In absence of clinical symptoms, the presence of pyuria, bacteria, and/or nitrites on the urinalysis result does not correlate with infection.   CBC WITH AUTO DIFFERENTIAL - Abnormal; Notable for the following components:    Hemoglobin 12.8 (*)     Neutrophil % 83.9 (*)     Lymphocyte % 7.3 (*)     Immature Grans % 2.2 (*)     Neutrophils, Absolute 8.20 (*)     Immature Grans, Absolute 0.22 (*)     All other components within normal limits   RAPID STREP A SCREEN - Normal   COVID-19 AND FLU A/B, NP SWAB IN TRANSPORT MEDIA 8-12 HR TAT - Normal    Narrative:     Fact sheet for providers: https://www.fda.gov/media/368846/download    Fact sheet for patients: https://www.fda.gov/media/573248/download    Test performed by PCR.   LACTIC ACID, PLASMA - Normal   ACETAMINOPHEN LEVEL - Normal   BLOOD CULTURE   BLOOD CULTURE   RAINBOW DRAW    Narrative:     The following orders were created for panel order [place] Draw.  Procedure                               Abnormality         Status                     ---------                               -----------         ------                     Green Top (Gel)[[id number]]                                  Final result               Lavender Top[[id number]]                                     Final result               Gold Top - SST[[id number]]                                                              Light Blue Top[[id number]]                                                              Green Top (Gel)[[id number]]                                                               Please view results for these tests on the individual orders.   EHRLICHIA PROFILE, DNA PCR   RICKETTSIA SPECIES DNA, REAL TIME PCR   LYME DISEASE TOTAL ANTIBODY WITH REFLEX TO IMMUNOASSAY   URINALYSIS, MICROSCOPIC ONLY   CBC AND DIFFERENTIAL    Narrative:     The following  orders were created for panel order CBC & Differential.  Procedure                               Abnormality         Status                     ---------                               -----------         ------                     CBC Auto Differential[[id number]]        Abnormal            Final result                 Please view results for these tests on the individual orders.   GREEN TOP   LAVENDER TOP   LIGHT BLUE TOP       EKG:   No orders to display       Meds given in ED:   Medications   sodium chloride 0.9 % flush 10 mL (has no administration in time range)   doxycycline (VIBRAMYCIN) 100 mg in sodium chloride 0.9 % 100 mL IVPB-VTB (has no administration in time range)   sodium chloride 0.9 % bolus 1,000 mL (0 mL Intravenous Stopped 7/26/23 2302)       Imaging results:  XR Chest 2 View    Result Date: 7/26/2023  Linear scarring versus atelectasis noted at the left lung base.  This report was finalized on 7/26/2023 9:10 PM by Dr. Betsy Grider M.D.      CT Head Without Contrast    Result Date: 7/26/2023  No acute intracranial findings.  Radiation dose reduction techniques were utilized, including automated exposure control and exposure modulation based on body size.  This report was finalized on 7/26/2023 9:08 PM by Dr. Betsy Grider M.D.       Ambulatory status:   - ad consuelo    Social issues:   Social History     Socioeconomic History    Marital status:     Number of children: 2   Tobacco Use    Smoking status: Never    Smokeless tobacco: Never   Vaping Use    Vaping Use: Never used   Substance and Sexual Activity    Alcohol use: Yes     Comment: socially    Drug use: No    Sexual activity: Defer       NIH Stroke Scale:       Tessie Nelson RN  07/26/23 23:02 EDT

## 2023-07-27 NOTE — ED NOTES
Pt signed EMTALA and given ED Summary for handoff to [facility]. Pt ambulated with steady gait out of dept.

## 2023-07-27 NOTE — FSED PROVIDER NOTE
Subjective   History of Present Illness  Patient with about 2-week duration of what seems like daily fevers.  He also has some confusion and word finding difficulty.  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  He has a hard time grasping onto certain items.  He complains of joint pains, muscle aches and over the past couple days has up to palm sized circular red rashes which are nontender.  These are nonraised.  He denies any abdominal complaints.  No chest complaints.  No  issues.  No back or flank issues.  No trauma.  About a week before the symptoms started he was in [facility] and at the [facility] and did receive some insect bites but no particular tick bites.  He thought he may have been bitten by mosquitoes    Review of Systems    Past Medical History:   Diagnosis Date    Depression     Hypertension        No Known Allergies    Past Surgical History:   Procedure Laterality Date    ANKLE SURGERY Left     COLONOSCOPY      COLONOSCOPY N/A 4/19/2023    Procedure: COLONOSCOPY FOR SCREENING;  Surgeon: Dax Colon MD;  [place] [facility] [place]  Service: Gastroenterology;  Laterality: N/A;  polyps    ENDOSCOPY N/A 7/5/2023    Procedure: ESOPHAGOGASTRODUODENOSCOPY with possible esophageal dilation;  Surgeon: Dax Colon MD;  [place] [facility] [place];  Service: Gastroenterology;  Laterality: N/A;  gastric polyps, dilated to 15 mm, esophagitis and stricture       Family History   Problem Relation Age of Onset    Diverticulitis Mother     Migraines Father     Colon cancer Neg Hx     Colon polyps Neg Hx     Irritable bowel syndrome Neg Hx     Ulcerative colitis Neg Hx     Crohn's disease Neg Hx        Social History     Socioeconomic History    Marital status:     Number of children: 2   Tobacco Use    Smoking status: Never    Smokeless tobacco: Never   Vaping Use    Vaping Use: Never used   Substance and  Sexual Activity    Alcohol use: Yes     Comment: socially    Drug use: No    Sexual activity: Defer           Objective   Physical Exam  Vitals and nursing note reviewed.   Constitutional:       Appearance: Normal appearance.   HENT:      Head: Normocephalic.      Right Ear: External ear normal.      Left Ear: External ear normal.      Nose: Nose normal.      Mouth/Throat:      Mouth: Mucous membranes are moist.      Pharynx: No oropharyngeal exudate or posterior oropharyngeal erythema.   Eyes:      Conjunctiva/sclera: Conjunctivae normal.   Cardiovascular:      Rate and Rhythm: Normal rate.   Pulmonary:      Effort: Pulmonary effort is normal.   Abdominal:      General: There is no distension.   Musculoskeletal:         General: No tenderness.      Cervical back: Normal range of motion.   Skin:     Findings: Rash (Nontender, flat, nonraised macules throughout body.  These are no larger than palm size.  Located through extremities and back) present.   Neurological:      Mental Status: He is alert and oriented to person, place, and time.   Psychiatric:         Mood and Affect: Mood normal.       Procedures           ED Course  ED Course as of 07/26/23 2231 Wed Jul 26, 2023 2156 Pt and wife would like to be admitted vs going home and trying to see team in outpt. [FK]      ED Course User Index  [FK] Shen Maciel MD                                           Medical Decision Making  Case discussed with hospitalist team who will graciously admit the patient.  Dr. Lee will be accepting physician.  We will give patient dose of IV doxycycline at this time    I think patient would benefit from seeing infectious disease while in the hospital.    Problems Addressed:  FUO (fever of unknown origin): complicated acute illness or injury  Word finding difficulty: complicated acute illness or injury    Amount and/or Complexity of Data Reviewed  Labs: ordered.  Radiology: ordered.    Risk  Prescription drug  management.  Decision regarding hospitalization.        Final diagnoses:   FUO (fever of unknown origin)   Word finding difficulty       ED Disposition  ED Disposition       ED Disposition   Decision to Admit    Condition   --    Comment   Level of Care: Med/Surg [1]   Diagnosis: FUO (fever of unknown origin) [195781]   Admitting Physician: TAMAR NOBLE [[id number]]   Attending Physician: TAMAR NOBLE [[id number]]   Isolate for COVID?: No [0]   Certification: I Certify That Inpatient Hospital Services Are Medically Necessary For Greater Than 2 Midnights                 No follow-up provider specified.       Medication List      No changes were made to your prescriptions during this visit.

## 2023-07-27 NOTE — ED NOTES
Pt presents to ED with c/o fever x 1 week, muscle aches, joint pain, tremors and difficulty finding words. Pt reports 2 weeks ago traveling to [place] and hiking around beach, had lots of insect bites, but never saw what bit him. Pt has erythema rash to truck and extremities that has been there a few days. Pt speech is clear and logical at this time.

## 2023-07-27 NOTE — PLAN OF CARE
Goal Outcome Evaluation:  Plan of Care Reviewed With: patient        Progress: no change  Outcome Evaluation: Patient admitted to unit, AOx4. Several erythematous round rashes that are not painful or itchy per patient all over skin. Patient c/o of mild neck stiffness, given one dose of oral tylenol with relief of symptoms. Patient able to ambulate independently. VSS, awaiting evaluation by physician, will continue to monitor.

## 2023-07-27 NOTE — CONSULTS
"Referring Provider: Dr Mulligan    Reason for Consultation: fever and rash    History of present illness:  Sanjeev Stubbs is a 57 y.o. who I am asked to evaluate and give opinion for \"fever and rash.\" History is obtained from the patient, his wife, and review of the old medical records which I summarize/synthesize as follows: He presented to the ER on 7/26/23 (yesterday) with about two weeks of fever, body aches, joint pains, patchy non-pruritic rash, word finding difficulty, and tremors. He's had some occasional loose stools and neck stiffness.  He initially went to urgent care and tested negative and was recommended to treat his symptoms w/ OTC medications. However, his symptoms progressed so he came in yesterday for evaluation.     Just prior to onset of symptoms, he had been doing some hiking in [place] on the sand [place] near [facility] and in some swampy areas. He had multiple bug bites. No history of serious or unusual infections as an adult. He works as a . No contact w/ young/sick children. No other travel besides [place] other than going to visit his son in [place] a year ago. No hardware in the body. He did start a PPI about a week prior to symptom onset. No recent mouth pain or dental work.     In the ER, he was initially febrile but this morning had a fever to 101.7 F. Labs notable for WBC 9, Plt 362, ALT 75, AST 52, and a UA negative for LE and nitrites. CT head and CXR didn't show any acute process. He was started on IV doxycycline. ID asked to evaluate.     Past Medical History:   Diagnosis Date    Depression     Hypertension        Past Surgical History:   Procedure Laterality Date    ANKLE SURGERY Left     COLONOSCOPY      COLONOSCOPY N/A 4/19/2023    Procedure: COLONOSCOPY FOR SCREENING;  Surgeon: Dax Colon MD;  Location: [facility];  Service: Gastroenterology;  Laterality: N/A;  polyps    ENDOSCOPY N/A 7/5/2023    Procedure: ESOPHAGOGASTRODUODENOSCOPY with possible " esophageal dilation;  Surgeon: Dax Colon MD;  [place]: [facility] [place];  Service: Gastroenterology;  Laterality: N/A;  gastric polyps, dilated to 15 mm, esophagitis and stricture       Social History:  Lives in [place], [place]  Works for [employer] as a     Antibiotic allergies and intolerances:  None    Medications:    Current Facility-Administered Medications:     acetaminophen (TYLENOL) tablet 650 mg, 650 mg, Oral, Q4H PRN, 650 mg at 07/27/23 0905 **OR** acetaminophen (TYLENOL) 160 MG/5ML solution 650 mg, 650 mg, Oral, Q4H PRN **OR** acetaminophen (TYLENOL) suppository 650 mg, 650 mg, Rectal, Q4H PRN, Chely Gardner APRN    atorvastatin (LIPITOR) tablet 20 mg, 20 mg, Oral, Daily, Chely Gardner APRN, 20 mg at 07/27/23 0901    sennosides-docusate (PERICOLACE) 8.6-50 MG per tablet 2 tablet, 2 tablet, Oral, BID **AND** polyethylene glycol (MIRALAX) packet 17 g, 17 g, Oral, Daily PRN **AND** bisacodyl (DULCOLAX) EC tablet 5 mg, 5 mg, Oral, Daily PRN **AND** bisacodyl (DULCOLAX) suppository 10 mg, 10 mg, Rectal, Daily PRN, Chely Gardner APRN    buPROPion XL (WELLBUTRIN XL) 24 hr tablet 150 mg, 150 mg, Oral, Daily, Chely Gardner APRN, 150 mg at 07/27/23 0901    Calcium Replacement - Follow Nurse / BPA Driven Protocol, , Does not apply, PRN, Chely Gardner APRN    doxycycline (VIBRAMYCIN) 100 mg in sodium chloride 0.9 % 100 mL IVPB-VTB, 100 mg, Intravenous, Q12H, Paula Nava APRN    lisinopril (PRINIVIL,ZESTRIL) tablet 20 mg, 20 mg, Oral, Daily, Chely Gardner APRN, 20 mg at 07/27/23 0901    Magnesium Standard Dose Replacement - Follow Nurse / BPA Driven Protocol, , Does not apply, PRN, Gardner, Chely Farhat, APRN    pantoprazole (PROTONIX) EC tablet 40 mg, 40 mg, Oral, Q AM, Chely Gardner APRN, 40 mg at 07/27/23 0606    Phosphorus Replacement - Follow Nurse / BPA Driven Protocol, , Does not apply, PRN, Gardner, Chely  VALERIE Dougherty    Potassium Replacement - Follow Nurse / BPA Driven Protocol, , Does not apply, PRN, Chely Gardner APRN    sertraline (ZOLOFT) tablet 100 mg, 100 mg, Oral, Daily, Chely Gardner APRN, 100 mg at 07/27/23 0901    sodium chloride 0.9 % flush 10 mL, 10 mL, Intravenous, PRN, Shen Maciel MD    sodium chloride 0.9 % flush 10 mL, 10 mL, Intravenous, Q12H, Chely Gardner APRN, 10 mL at 07/27/23 0900    sodium chloride 0.9 % flush 10 mL, 10 mL, Intravenous, PRN, Chely Gardner APRN    sodium chloride 0.9 % infusion 40 mL, 40 mL, Intravenous, PRN, Chely Gardner APRN      Objective   Vital Signs   Temp:  [98.5 °F (36.9 °C)-101.7 °F (38.7 °C)] 98.7 °F (37.1 °C)  Heart Rate:  [85-98] 91  Resp:  [16] 16  BP: (129-164)/(70-98) 147/84    Physical Exam:   General: awake, alert, very nice, mildly anxious   Eyes: no scleral icterus  ENT: no thrush  Neck: supple w/o meningismus (he says this has improved)  Cardiovascular: NR; no murmur  Respiratory: normal work of breathing on ambient air  GI: Abdomen is soft, not tender, + bowel sounds in all four quadrants  :  no Presley catheter  Skin: flat non pruritic rash scattered around entire body  Neurological: Alert and oriented x 3  Psychiatric: Normal mood and affect   Vasc: PIV w/o erythema    Labs:     Lab Results   Component Value Date    WBC 11.95 (H) 07/27/2023    HGB 12.8 (L) 07/27/2023    HCT 38.9 07/27/2023    MCV 89.4 07/27/2023     07/27/2023       Lab Results   Component Value Date    GLUCOSE 106 (H) 07/27/2023    BUN 11 07/27/2023    CREATININE 0.82 07/27/2023    EGFRIFNONA 90 09/11/2019    EGFRIFAFRI 97 11/08/2016    BCR 13.4 07/27/2023    CO2 27.9 07/27/2023    CALCIUM 8.5 (L) 07/27/2023    PROTENTOTREF 6.3 03/10/2023    ALBUMIN 3.3 (L) 07/27/2023    LABIL2 2.2 03/10/2023    AST 45 (H) 07/27/2023    ALT 65 (H) 07/27/2023     Lyme Ab pending  Ehrlichia PCR pending  Rickettsia PCR pending  WNV IgM and  IgG ordered  RPR ordered  LA 0.8  UA negative LE and nitrite    Microbiology:  7/26 Strep A PCR: negative  7/26 COVD/Flu/RSV PCR: negative  7/26 BCx: pending    Radiology:  CT head no acute process  CXR with atelectasis in the LLL; no pneumonia on my read    ASSESSMENT/PLAN:  Fever in adult  Painful, non-pruritic rash  Elevated liver enzymes  Bug bites  Headache and previous neck stiffness    I think an infectious or inflammatory disease (ie autoimmune illness) is the most likely cause of his symptoms. The differential is wide so will send a broad workup. His rash has some characteristics of erythema nodosum; however, it is in more places than just the shins where it is typically seen.     I will follow-up his tests for Ehrlichia, Rickettsial infection, and Lyme (less likely). I will continue empiric doxycycline. Normal/elevated WBC and normal platelets would argue against tick-borne illness but still possible.     Check WNV IgM and IgG, RPR, SALEEM, ANCA, ACE level, ferritin, ESR, CRP, Histo studies, Mycoplasma, HIV Ab, and an RPP. Given fever, headache and neck stiffness, will also get an LP as something like Enterovirus could be the culprit especially w/ his GI symptoms.     ID will follow. Case and its management discussed with Dr Mulligan.

## 2023-07-27 NOTE — SIGNIFICANT NOTE
07/27/23 0954   OTHER   Discipline physical therapist   Rehab Time/Intention   Session Not Performed other (see comments)  (Per Nurse and chart review, pt is up ad consuelo and does not req skilled PT servcies. Will sign off)   Therapy Assessment/Plan (PT)   Criteria for Skilled Interventions Met (PT) no problems identified which require skilled intervention

## 2023-07-27 NOTE — ED NOTES
Pt accepted to [facility]. Per ER MD, pt okay to go POV. Wife able to drive once clean bed assigned.

## 2023-07-27 NOTE — PROGRESS NOTES
"Nutrition Services    Patient Name:  Sanjeev Stubbs  YOB: 1965  MRN: [id number]  Admit Date:  7/26/2023    Assessment Date:  07/27/23    Comment: MST score of 2   56 y/o male admitted w/ fever/rash. Pt also having tremors/word finding difficulty- CTH negative. Concern for tick borne illness 2/2 recent traveling w/ hiking.   Decreased PO intake and 2-13# wt loss reported on admission. Weight history indicates no significant weight loss, weight appears to fluctuate. BMI 31.13.   No PO intake recorded yet. Will follow and monitor PO intakes and tolerance.       CLINICAL NUTRITION ASSESSMENT      Reason for Assessment MST score 2+     Diagnosis/Problem   Fever of unknown origin   Medical/Surgical History Past Medical History:   Diagnosis Date    Depression     Hypertension        Past Surgical History:   Procedure Laterality Date    ANKLE SURGERY Left     COLONOSCOPY      COLONOSCOPY N/A 4/19/2023    Procedure: COLONOSCOPY FOR SCREENING;  Surgeon: Dax Colon MD;  [place] [facility] [place];  Service: Gastroenterology;  Laterality: N/A;  polyps    ENDOSCOPY N/A 7/5/2023    Procedure: ESOPHAGOGASTRODUODENOSCOPY with possible esophageal dilation;  Surgeon: Dax Colon MD;  [place] [facility] [place];  Service: Gastroenterology;  Laterality: N/A;  gastric polyps, dilated to 15 mm, esophagitis and stricture        Encounter Information        Nutrition History:     Food Preferences:    Supplements:    Factors Affecting Intake: decreased appetite     Anthropometrics        Current Height  Current Weight  BMI kg/m2 Height: 182.9 cm (72\")  Weight: 104 kg (229 lb 8 oz) (07/27/23 0109)  Body mass index is 31.13 kg/m².   Adjusted BMI (if applicable)    BMI Category Obese, Class I (30 - 34.9)       Admission Weight 229# bedscale       Ideal Body Weight (IBW) 178#   Adjusted IBW (if applicable)        Usual Body Weight (UBW) 220-230s   Weight Change/Trend Other: fluctuates       Weight History Wt " Readings from Last 30 Encounters:   07/27/23 0109 104 kg (229 lb 8 oz)   07/26/23 1959 103 kg (227 lb)   07/05/23 0646 107 kg (235 lb 3.2 oz)   06/30/23 0923 103 kg (228 lb)   05/31/23 1523 104 kg (229 lb)   04/19/23 1205 99.3 kg (219 lb)   04/18/23 1323 109 kg (240 lb)   03/10/23 1234 103 kg (227 lb 1.6 oz)   12/22/22 0857 104 kg (230 lb)   12/09/22 1143 107 kg (235 lb)   11/26/22 1004 109 kg (240 lb)   03/08/22 1450 107 kg (234 lb 14.4 oz)   03/05/20 0838 111 kg (243 lb 12.8 oz)   09/11/19 0959 110 kg (242 lb 6.4 oz)   08/14/18 1521 109 kg (239 lb 11.2 oz)   04/06/18 0932 109 kg (241 lb 6.4 oz)   11/08/16 0904 106 kg (234 lb 9.6 oz)   06/07/16 1407 64.9 kg (143 lb)           --  Tests/Procedures        Tests/Procedures CT scan, X-Ray     Labs       Pertinent Labs    Results from last 7 days   Lab Units 07/27/23  1042 07/27/23  0610 07/26/23 2016   SODIUM mmol/L  --  141 138   POTASSIUM mmol/L 3.9 3.8 3.2*   CHLORIDE mmol/L  --  105 100   CO2 mmol/L  --  27.9 30.4*   BUN mg/dL  --  11 18   CREATININE mg/dL  --  0.82 0.81   CALCIUM mg/dL  --  8.5* 9.1   BILIRUBIN mg/dL  --  0.7 0.6   ALK PHOS U/L  --  160* 197*   ALT (SGPT) U/L  --  65* 75*   AST (SGOT) U/L  --  45* 52*   GLUCOSE mg/dL  --  106* 126*     Results from last 7 days   Lab Units 07/27/23  0610   HEMOGLOBIN g/dL 12.8*   HEMATOCRIT % 38.9   WBC 10*3/mm3 11.95*   ALBUMIN g/dL 3.3*     Results from last 7 days   Lab Units 07/27/23  0610 07/26/23 2016   PLATELETS 10*3/mm3 330 362     COVID19   Date Value Ref Range Status   07/27/2023 Not Detected Not Detected - Ref. Range Final     Lab Results   Component Value Date    HGBA1C 5.40 03/03/2022          Medications           Scheduled Medications buPROPion XL, 150 mg, Oral, Daily  doxycycline, 100 mg, Intravenous, Q12H  lisinopril, 20 mg, Oral, Daily  pantoprazole, 40 mg, Oral, Q AM  senna-docusate sodium, 2 tablet, Oral, BID  sertraline, 100 mg, Oral, Daily  sodium chloride, 10 mL, Intravenous, Q12H        Infusions hold, 1 each       PRN Medications   acetaminophen **OR** acetaminophen **OR** acetaminophen    senna-docusate sodium **AND** polyethylene glycol **AND** bisacodyl **AND** bisacodyl    Calcium Replacement - Follow Nurse / BPA Driven Protocol    hold    Magnesium Standard Dose Replacement - Follow Nurse / BPA Driven Protocol    Phosphorus Replacement - Follow Nurse / BPA Driven Protocol    Potassium Replacement - Follow Nurse / BPA Driven Protocol    sodium chloride    sodium chloride    sodium chloride     Physical Findings          Physical Appearance alert, obese, oriented, room air   Oral/Mouth Cavity WNL   Edema  no edema   Gastrointestinal last bowel movement: pending   Skin  skin intact   Tubes/Drains/Lines none   NFPE Not indicated at this time   --  Current Nutrition Orders & Evaluation of Intake       Oral Nutrition     Food Allergies NKFA   Current PO Diet Diet: Cardiac Diets; Healthy Heart (2-3 Na+); Texture: Regular Texture (IDDSI 7); Fluid Consistency: Thin (IDDSI 0)   Supplement n/a   PO Evaluation     % PO Intake pending    # of Days Evaluated    --  PES STATEMENT / NUTRITION DIAGNOSIS      Nutrition Dx Problem  Problem: Predicted Suboptimal Intake  Etiology: Factors Affecting Nutrition   Signs/Symptoms: Report/Observation    Comment:    --  NUTRITION INTERVENTION / PLAN OF CARE      Intervention Goal(s) Maintain nutrition status, Maintain intake, and Maintain weight         RD Intervention/Action Encourage intake and Follow Tx Progress         Prescription/Orders:       PO Diet       Supplements       Snacks       Enteral Nutrition       Parenteral Nutrition    New Prescription Ordered? No changes at this time   --      Monitor/Evaluation Per protocol   Discharge Plan/Needs Pending clinical course   Education Will instruct as appropriate   --    RD to follow per protocol.      Electronically signed by:  Vijaya Montes RD  07/27/23 15:22 EDT

## 2023-07-28 ENCOUNTER — APPOINTMENT (OUTPATIENT)
Dept: GENERAL RADIOLOGY | Facility: HOSPITAL | Age: 58
End: 2023-07-28
Payer: COMMERCIAL

## 2023-07-28 ENCOUNTER — APPOINTMENT (OUTPATIENT)
Dept: MRI IMAGING | Facility: HOSPITAL | Age: 58
End: 2023-07-28
Payer: COMMERCIAL

## 2023-07-28 PROBLEM — A69.20 LYME DISEASE: Status: ACTIVE | Noted: 2023-07-26

## 2023-07-28 LAB
ACE SERPL-CCNC: <15 U/L (ref 14–82)
ALBUMIN SERPL-MCNC: 3.5 G/DL (ref 3.5–5.2)
ALBUMIN/GLOB SERPL: 1.2 G/DL
ALP SERPL-CCNC: 160 U/L (ref 39–117)
ALT SERPL W P-5'-P-CCNC: 56 U/L (ref 1–41)
ANION GAP SERPL CALCULATED.3IONS-SCNC: 7 MMOL/L (ref 5–15)
APPEARANCE CSF: CLEAR
APPEARANCE CSF: CLEAR
AST SERPL-CCNC: 35 U/L (ref 1–40)
B BURGDOR AB SER-IMP: ABNORMAL
B BURGDOR IGG SERPL QL IA: POSITIVE
B BURGDOR IGG+IGM SER QL IA: POSITIVE
B BURGDOR IGM SERPL QL IA: POSITIVE
BILIRUB SERPL-MCNC: 0.6 MG/DL (ref 0–1.2)
BUN SERPL-MCNC: 11 MG/DL (ref 6–20)
BUN/CREAT SERPL: 14.1 (ref 7–25)
C GATTII+NEOFOR DNA CSF QL NAA+NON-PROBE: NOT DETECTED
C-ANCA TITR SER IF: NORMAL TITER
CALCIUM SPEC-SCNC: 8.6 MG/DL (ref 8.6–10.5)
CENTROMERE B AB SER-ACNC: <0.2 AI (ref 0–0.9)
CHLORIDE SERPL-SCNC: 105 MMOL/L (ref 98–107)
CHROMATIN AB SERPL-ACNC: <0.2 AI (ref 0–0.9)
CMV DNA CSF QL NAA+PROBE: NOT DETECTED
CO2 SERPL-SCNC: 29 MMOL/L (ref 22–29)
COLOR CSF: COLORLESS
COLOR CSF: COLORLESS
CREAT SERPL-MCNC: 0.78 MG/DL (ref 0.76–1.27)
DEPRECATED RDW RBC AUTO: 40.9 FL (ref 37–54)
DSDNA AB SER-ACNC: 1 IU/ML (ref 0–9)
E COLI K1 DNA CSF QL NAA+NON-PROBE: NOT DETECTED
EGFRCR SERPLBLD CKD-EPI 2021: 105.3 ML/MIN/1.73
ENA JO1 AB SER-ACNC: <0.2 AI (ref 0–0.9)
ENA RNP AB SER-ACNC: <0.2 AI (ref 0–0.9)
ENA SCL70 AB SER-ACNC: <0.2 AI (ref 0–0.9)
ENA SM AB SER-ACNC: <0.2 AI (ref 0–0.9)
ENA SS-A AB SER-ACNC: <0.2 AI (ref 0–0.9)
ENA SS-B AB SER-ACNC: 0.2 AI (ref 0–0.9)
ERYTHROCYTE [DISTWIDTH] IN BLOOD BY AUTOMATED COUNT: 12.8 % (ref 12.3–15.4)
EV RNA CSF QL NAA+PROBE: NOT DETECTED
GLOBULIN UR ELPH-MCNC: 2.9 GM/DL
GLUCOSE CSF-MCNC: 63 MG/DL (ref 40–70)
GLUCOSE SERPL-MCNC: 106 MG/DL (ref 65–99)
GP B STREP DNA SPEC QL NAA+PROBE: NOT DETECTED
HAEM INFLU SEROTYP DNA SPEC NAA+PROBE: NOT DETECTED
HCT VFR BLD AUTO: 37.1 % (ref 37.5–51)
HGB BLD-MCNC: 12.6 G/DL (ref 13–17.7)
HHV6 DNA CSF QL NAA+PROBE: NOT DETECTED
HIV1+2 AB SER QL: NORMAL
HSV1 DNA CSF QL NAA+PROBE: NOT DETECTED
HSV2 DNA CSF QL NAA+PROBE: NOT DETECTED
L MONOCYTOG RRNA SPEC QL PROBE: NOT DETECTED
Lab: NORMAL
M PNEUMO IGG SER IA-ACNC: 1526 U/ML (ref 0–99)
M PNEUMO IGM SER IA-ACNC: 1222 U/ML (ref 0–769)
MCH RBC QN AUTO: 30 PG (ref 26.6–33)
MCHC RBC AUTO-ENTMCNC: 34 G/DL (ref 31.5–35.7)
MCV RBC AUTO: 88.3 FL (ref 79–97)
METHOD: ABNORMAL
METHOD: ABNORMAL
MYELOPEROXIDASE AB SER IA-ACNC: <0.2 UNITS (ref 0–0.9)
N MEN DNA SPEC QL NAA+PROBE: NOT DETECTED
NUC CELL # CSF MANUAL: 1 /MM3 (ref 0–5)
NUC CELL # CSF MANUAL: 2 /MM3 (ref 0–5)
P-ANCA ATYPICAL TITR SER IF: NORMAL TITER
P-ANCA TITR SER IF: NORMAL TITER
PARECHOVIRUS A RNA CSF QL NAA+NON-PROBE: NOT DETECTED
PLATELET # BLD AUTO: 333 10*3/MM3 (ref 140–450)
PMV BLD AUTO: 9.4 FL (ref 6–12)
POTASSIUM SERPL-SCNC: 3.7 MMOL/L (ref 3.5–5.2)
PROT CSF-MCNC: 34.1 MG/DL (ref 15–45)
PROT SERPL-MCNC: 6.4 G/DL (ref 6–8.5)
PROTEINASE3 AB SER IA-ACNC: <0.2 UNITS (ref 0–0.9)
RBC # BLD AUTO: 4.2 10*6/MM3 (ref 4.14–5.8)
RBC # CSF MANUAL: 16 /MM3 (ref 0–0)
RBC # CSF MANUAL: 47 /MM3 (ref 0–0)
S PNEUM DNA CSF QL NAA+NON-PROBE: NOT DETECTED
SODIUM SERPL-SCNC: 141 MMOL/L (ref 136–145)
TUBE # CSF: 1
TUBE # CSF: 4
VZV DNA CSF QL NAA+PROBE: NOT DETECTED
WBC NRBC COR # BLD: 7.32 10*3/MM3 (ref 3.4–10.8)

## 2023-07-28 PROCEDURE — 87205 SMEAR GRAM STAIN: CPT | Performed by: INTERNAL MEDICINE

## 2023-07-28 PROCEDURE — 0 GADOBENATE DIMEGLUMINE 529 MG/ML SOLUTION: Performed by: INTERNAL MEDICINE

## 2023-07-28 PROCEDURE — 82945 GLUCOSE OTHER FLUID: CPT | Performed by: INTERNAL MEDICINE

## 2023-07-28 PROCEDURE — 87070 CULTURE OTHR SPECIMN AEROBIC: CPT | Performed by: INTERNAL MEDICINE

## 2023-07-28 PROCEDURE — 87483 CNS DNA AMP PROBE TYPE 12-25: CPT | Performed by: INTERNAL MEDICINE

## 2023-07-28 PROCEDURE — 86789 WEST NILE VIRUS ANTIBODY: CPT | Performed by: INTERNAL MEDICINE

## 2023-07-28 PROCEDURE — 86788 WEST NILE VIRUS AB IGM: CPT | Performed by: INTERNAL MEDICINE

## 2023-07-28 PROCEDURE — 84157 ASSAY OF PROTEIN OTHER: CPT | Performed by: INTERNAL MEDICINE

## 2023-07-28 PROCEDURE — A9577 INJ MULTIHANCE: HCPCS | Performed by: INTERNAL MEDICINE

## 2023-07-28 PROCEDURE — 99223 1ST HOSP IP/OBS HIGH 75: CPT | Performed by: PSYCHIATRY & NEUROLOGY

## 2023-07-28 PROCEDURE — 80053 COMPREHEN METABOLIC PANEL: CPT | Performed by: NURSE PRACTITIONER

## 2023-07-28 PROCEDURE — 85027 COMPLETE CBC AUTOMATED: CPT | Performed by: NURSE PRACTITIONER

## 2023-07-28 PROCEDURE — 0 LIDOCAINE 1 % SOLUTION: Performed by: RADIOLOGY

## 2023-07-28 PROCEDURE — 87015 SPECIMEN INFECT AGNT CONCNTJ: CPT | Performed by: INTERNAL MEDICINE

## 2023-07-28 PROCEDURE — G0432 EIA HIV-1/HIV-2 SCREEN: HCPCS | Performed by: INTERNAL MEDICINE

## 2023-07-28 PROCEDURE — 70553 MRI BRAIN STEM W/O & W/DYE: CPT

## 2023-07-28 PROCEDURE — 88108 CYTOPATH CONCENTRATE TECH: CPT | Performed by: INTERNAL MEDICINE

## 2023-07-28 PROCEDURE — 99233 SBSQ HOSP IP/OBS HIGH 50: CPT | Performed by: INTERNAL MEDICINE

## 2023-07-28 PROCEDURE — 89050 BODY FLUID CELL COUNT: CPT | Performed by: INTERNAL MEDICINE

## 2023-07-28 RX ORDER — LIDOCAINE HYDROCHLORIDE 10 MG/ML
10 INJECTION, SOLUTION INFILTRATION; PERINEURAL ONCE
Status: COMPLETED | OUTPATIENT
Start: 2023-07-28 | End: 2023-07-28

## 2023-07-28 RX ADMIN — BUPROPION HYDROCHLORIDE 150 MG: 150 TABLET, EXTENDED RELEASE ORAL at 11:21

## 2023-07-28 RX ADMIN — Medication 10 ML: at 20:52

## 2023-07-28 RX ADMIN — LISINOPRIL 20 MG: 20 TABLET ORAL at 11:21

## 2023-07-28 RX ADMIN — SERTRALINE 100 MG: 100 TABLET, FILM COATED ORAL at 11:21

## 2023-07-28 RX ADMIN — PANTOPRAZOLE SODIUM 40 MG: 40 TABLET, DELAYED RELEASE ORAL at 05:55

## 2023-07-28 RX ADMIN — DOXYCYCLINE 100 MG: 100 INJECTION, POWDER, LYOPHILIZED, FOR SOLUTION INTRAVENOUS at 11:21

## 2023-07-28 RX ADMIN — DOXYCYCLINE 100 MG: 100 INJECTION, POWDER, LYOPHILIZED, FOR SOLUTION INTRAVENOUS at 23:07

## 2023-07-28 RX ADMIN — ACETAMINOPHEN 650 MG: 325 TABLET, FILM COATED ORAL at 05:55

## 2023-07-28 RX ADMIN — GADOBENATE DIMEGLUMINE 20 ML: 529 INJECTION, SOLUTION INTRAVENOUS at 10:34

## 2023-07-28 RX ADMIN — LIDOCAINE HYDROCHLORIDE 4 ML: 10 INJECTION, SOLUTION INFILTRATION; PERINEURAL at 09:13

## 2023-07-28 NOTE — PLAN OF CARE
Goal Outcome Evaluation:  Plan of Care Reviewed With: patient        Progress: no change  Outcome Evaluation: Patient with no new complaints, AOx4, denies pain. Given IV ABX as ordered, no changes in pink macules on skin, patient continues to endorse they are not painful or itchy. Patient able to ambulate independently. Will continue to monitor.

## 2023-07-28 NOTE — PROGRESS NOTES
Name: Sanjeev Stubbs ADMIT: 2023   : 1965  PCP: Harshil Garcia MD    MRN: [id number] LOS: 1 days   AGE/SEX: 57 y.o. male  ROOM: [facility]     Subjective   Subjective   Chief Complaint   Patient presents with    Speech Problem    Tremors     During triage multiple circular macular red areas over body,and extremities      I saw after he had returned from the lumbar puncture.  He was not reporting any neck pain or headache.  He was still laying supine.  No chest pain palpitation shortness of breath nausea vomiting diarrhea abdominal pain or dysuria reported.     Objective   Objective   Vital Signs  Temp:  [97.3 °F (36.3 °C)-100.7 °F (38.2 °C)] 97.5 °F (36.4 °C)  Heart Rate:  [68-89] 74  Resp:  [16-18] 18  BP: (137-159)/(79-96) 140/81  SpO2:  [95 %-97 %] 97 %  on   ;   Device (Oxygen Therapy): room air  Body mass index is 30.29 kg/m².    Physical Exam  Vitals and nursing note reviewed.   Constitutional:       General: He is not in acute distress.     Appearance: He is not diaphoretic.   HENT:      Head: Atraumatic.   Eyes:      General: No scleral icterus.     Conjunctiva/sclera: Conjunctivae normal.   Cardiovascular:      Rate and Rhythm: Normal rate and regular rhythm.      Pulses: Normal pulses.   Pulmonary:      Effort: Pulmonary effort is normal.      Breath sounds: No wheezing.   Abdominal:      General: There is no distension.      Palpations: Abdomen is soft.      Tenderness: There is no abdominal tenderness. There is no guarding or rebound.   Musculoskeletal:         General: No swelling or tenderness.   Skin:     General: Skin is warm and dry.   Neurological:      Mental Status: He is alert.      Comments: PERRL, no facial droop, tongue midline, palate elevated equally, did not test all cranial nerves due to his mobility limitations post-LP   Psychiatric:         Mood and Affect: Mood normal.         Behavior: Behavior normal.     Results Review  I reviewed the patient's new clinical  results.    Results from last 7 days   Lab Units 07/28/23  0605 07/27/23  0610 07/26/23 2016   WBC 10*3/mm3 7.32 11.95* 9.78   HEMOGLOBIN g/dL 12.6* 12.8* 12.8*   PLATELETS 10*3/mm3 333 330 362     Results from last 7 days   Lab Units 07/28/23  0605 07/27/23  1042 07/27/23  0610 07/26/23  2016   SODIUM mmol/L 141  --  141 138   POTASSIUM mmol/L 3.7 3.9 3.8 3.2*   CHLORIDE mmol/L 105  --  105 100   CO2 mmol/L 29.0  --  27.9 30.4*   BUN mg/dL 11  --  11 18   CREATININE mg/dL 0.78  --  0.82 0.81   GLUCOSE mg/dL 106*  --  106* 126*   EGFR mL/min/1.73 105.3  --  103.7 104.1     Results from last 7 days   Lab Units 07/28/23  0605 07/27/23  0610 07/26/23  2016   ALBUMIN g/dL 3.5 3.3* 3.8   BILIRUBIN mg/dL 0.6 0.7 0.6   ALK PHOS U/L 160* 160* 197*   AST (SGOT) U/L 35 45* 52*   ALT (SGPT) U/L 56* 65* 75*     Results from last 7 days   Lab Units 07/28/23  0605 07/27/23  0610 07/26/23  2016   CALCIUM mg/dL 8.6 8.5* 9.1   ALBUMIN g/dL 3.5 3.3* 3.8     Results from last 7 days   Lab Units 07/26/23 2017   LACTATE mmol/L 0.8     No results found for: HGBA1C, POCGLU    XR Chest 2 View    Result Date: 7/26/2023  Linear scarring versus atelectasis noted at the left lung base.  This report was finalized on 7/26/2023 9:10 PM by Dr. Betsy Grider M.D.      CT Head Without Contrast    Result Date: 7/26/2023  No acute intracranial findings.  Radiation dose reduction techniques were utilized, including automated exposure control and exposure modulation based on body size.  This report was finalized on 7/26/2023 9:08 PM by Dr. Betsy Grider M.D.      MRI Brain With & Without Contrast    Result Date: 7/28/2023  1. No acute intracranial abnormality is identified. 2. There are scattered tiny patchy nodular foci of T2 high signal in the frontoparietal white matter most consistent with mild small vessel disease. The remainder of the MRI of the brain is within normal limits. Specifically I see no evidence of encephalitis on this exam.  The patient did have a lumbar puncture performed earlier this morning, correlation with that result is suggested.      IR LUMBAR PUNCTURE DIAGNOSTIC    Result Date: 7/28/2023  Technically successful fluoroscopically guided lumbar puncture.  Reference air kerma: 13.3 mGy  This report was finalized on 7/28/2023 9:34 AM by Dr. Nikolas Rosado M.D.       I have personally reviewed all medications:  Scheduled Medications  buPROPion XL, 150 mg, Oral, Daily  doxycycline, 100 mg, Intravenous, Q12H  lisinopril, 20 mg, Oral, Daily  pantoprazole, 40 mg, Oral, Q AM  senna-docusate sodium, 2 tablet, Oral, BID  sertraline, 100 mg, Oral, Daily  sodium chloride, 10 mL, Intravenous, Q12H      Infusions  hold, 1 each      Diet  Diet: Cardiac Diets; Healthy Heart (2-3 Na+); Texture: Regular Texture (IDDSI 7); Fluid Consistency: Thin (IDDSI 0)    I have personally reviewed:  [x]  Laboratory   [x]  Microbiology   [x]  Radiology   [x]  EKG/Telemetry  []  Cardiology/Vascular   []  Pathology    []  Records       Assessment/Plan     Active Hospital Problems    Diagnosis  POA    **FUO (fever of unknown origin) [R50.9]  Yes    Rash [R21]  Yes    Elevated LFTs [R79.89]  Yes    Mixed hyperlipidemia [E78.2]  Yes    Essential hypertension [I10]  Yes    Depression [F32.A]  Yes      Resolved Hospital Problems   No resolved problems to display.       57 y.o. male admitted with FUO (fever of unknown origin).    Lyme disease: Blood test have returned positive with Lyme IgM and IgG.  He had bug bites while hiking recently but did not have any found ticks.  He has had improvement in LFTs on the doxycycline.  He did still have a fever yesterday afternoon.  Continue to monitor LP results and fever curve.  Infectious disease following.  Metabolic encephalopathy: Possibly secondary to his infectious issues and fever.  LP and MRI has been performed to check for potential encephalitis/meningitis.  Initial studies with low nucleated cells would appear to be  aseptic.  Awaiting culture.  Neurology following.  Elevated LFT: Monitor with treatment of above  Hypertension: Acceptable acutely.  HLD: Statin held  Ppx: SCD  Disposition: Home/few days    Expected Discharge Date: 7/31/2023; Expected Discharge Time:      Stas Leonard MD  [facility]  07/28/23  14:51 EDT    Dictated portions of note using dragon dictation software.  Copied text in this note has been reviewed by me and remains accurate as of 07/28/23

## 2023-07-28 NOTE — CONSULTS
"Neurology Consult Note    Consult Date: 7/28/2023    Referring MD: Harshil Garcia MD    Reason for Consult I have been asked to see the patient in neurological consultation to render advice and opinion regarding headache, word finding difficulty    Sanjeev Stubbs is a 57 y.o. male with hypertension, depression, otherwise healthy.  He reports that he took a hiking trip [place] and 2 weeks later developed persistent high fevers with a generalized headache and associated neck pain.  That ultimately began to improve and then he developed persistent tremor and mild word finding difficulty.  He was admitted to the hospital for these fevers as well as an associated rash.  There is concern for a possible arbovirus and CSF studies have been ordered.    Past Medical History:   Diagnosis Date    Depression     Hypertension        Exam  /83 (BP Location: Left arm, Patient Position: Lying)   Pulse 69   Temp 97.3 °F (36.3 °C) (Oral)   Resp 16   Ht 182.9 cm (72\")   Wt 101 kg (223 lb 5.2 oz)   SpO2 95%   BMI 30.29 kg/m²   Gen: NAD, vitals reviewed  MS: oriented x3, recent/remote memory intact, normal attention/concentration, mild word finding difficulty, no neglect.  CN: visual acuity grossly normal, PERRL, EOMI, no facial droop, no dysarthria  Motor: 5/5 throughout upper and lower extremities, normal tone, fairly mild low amplitude high-frequency postural and kinetic tremor  Sensory: Intact to cold temperature and vibration throughout  Reflexes: 2+ throughout  DATA:    Lab Results   Component Value Date    GLUCOSE 106 (H) 07/28/2023    CALCIUM 8.6 07/28/2023     07/28/2023    K 3.7 07/28/2023    CO2 29.0 07/28/2023     07/28/2023    BUN 11 07/28/2023    CREATININE 0.78 07/28/2023    EGFRIFAFRI 97 11/08/2016    EGFRIFNONA 90 09/11/2019    BCR 14.1 07/28/2023    ANIONGAP 7.0 07/28/2023     Lab Results   Component Value Date    WBC 7.32 07/28/2023    HGB 12.6 (L) 07/28/2023    HCT 37.1 " (L) 07/28/2023    MCV 88.3 07/28/2023     07/28/2023       Lab review: CBC, BMP unremarkable    Imaging review: MRI brain with and without contrast ordered    Diagnoses:  Aseptic meningitis  Postural tremor  Word-finding difficulty    Comment: Presumed aseptic meningitis/encephalitis, possibly arboviral.  Given his word finding difficulty I think an MRI of the brain is indicated to exclude any structural brain abnormality.    PLAN:  Agree with ID work-up and plan  Adding contrasted brain MRI    Discussed with Dr. Shafer this morning

## 2023-07-28 NOTE — PAYOR COMM NOTE
"Sanjeev Armstrong (57 y.o. Male)          U/D FOR [id number]    CONTACT LADONNA ZHANG  F# [phone]         Date of Birth   1965    Social Security Number       Address   [address] [place] [id number]    Home Phone       MRN   [id number]       Holiness   Jain    Marital Status                               Admission Date   23    Admission Type   Urgent    Admitting Provider   Ed Lee MD    Attending Provider   Stas Leonard MD    Department, Room/Bed   [address], [facility]6/       Discharge Date       Discharge Disposition       Discharge Destination                                 Attending Provider: Stas Leonard MD    Allergies: No Known Allergies    Isolation: None   Infection: None   Code Status: CPR    Ht: 182.9 cm (72\")   Wt: 101 kg (223 lb 5.2 oz)    Admission Cmt: None   Principal Problem: None                  Active Insurance as of 2023       Primary Coverage       Payor Plan Insurance Group Employer/Plan Group    [facility] [employer] [facility] [employer] EMPLOYEE [id number]       Payor Plan Address Payor Plan Phone Number Payor Plan Fax Number Effective Dates    [address] [phone]  2022 - None Entered    [place] [address]         Subscriber Name Subscriber Birth Date Member ID       SANJEEV ARMSTRONG 1965 [id number]                     Emergency Contacts        (Rel.) Home Phone Work Phone Mobile Phone    Kayleen Armstrong (Spouse) [phone] -- --                 Physician Progress Notes (last 48 hours)        Stas Leonard MD at 23 1451              Name: Sanjeev Armstrong ADMIT: 2023   : 1965  PCP: Harshil Garcia MD    MRN: [id number] LOS: 1 days   AGE/SEX: 57 y.o. male  ROOM: [facility]6/1     Subjective   Subjective   Chief Complaint   Patient presents with    Speech Problem    Tremors     During triage multiple circular macular red areas over body,and extremities      I saw " after he had returned from the lumbar puncture.  He was not reporting any neck pain or headache.  He was still laying supine.  No chest pain palpitation shortness of breath nausea vomiting diarrhea abdominal pain or dysuria reported.    Objective   Objective   Vital Signs  Temp:  [97.3 °F (36.3 °C)-100.7 °F (38.2 °C)] 97.5 °F (36.4 °C)  Heart Rate:  [68-89] 74  Resp:  [16-18] 18  BP: (137-159)/(79-96) 140/81  SpO2:  [95 %-97 %] 97 %  on   ;   Device (Oxygen Therapy): room air  Body mass index is 30.29 kg/m².    Physical Exam  Vitals and nursing note reviewed.   Constitutional:       General: He is not in acute distress.     Appearance: He is not diaphoretic.   HENT:      Head: Atraumatic.   Eyes:      General: No scleral icterus.     Conjunctiva/sclera: Conjunctivae normal.   Cardiovascular:      Rate and Rhythm: Normal rate and regular rhythm.      Pulses: Normal pulses.   Pulmonary:      Effort: Pulmonary effort is normal.      Breath sounds: No wheezing.   Abdominal:      General: There is no distension.      Palpations: Abdomen is soft.      Tenderness: There is no abdominal tenderness. There is no guarding or rebound.   Musculoskeletal:         General: No swelling or tenderness.   Skin:     General: Skin is warm and dry.   Neurological:      Mental Status: He is alert.      Comments: PERRL, no facial droop, tongue midline, palate elevated equally, did not test all cranial nerves due to his mobility limitations post-LP   Psychiatric:         Mood and Affect: Mood normal.         Behavior: Behavior normal.     Results Review  I reviewed the patient's new clinical results.    Results from last 7 days   Lab Units 07/28/23  0605 07/27/23  0610 07/26/23 2016   WBC 10*3/mm3 7.32 11.95* 9.78   HEMOGLOBIN g/dL 12.6* 12.8* 12.8*   PLATELETS 10*3/mm3 333 330 362     Results from last 7 days   Lab Units 07/28/23  0605 07/27/23  1042 07/27/23  0610 07/26/23 2016   SODIUM mmol/L 141  --  141 138   POTASSIUM mmol/L 3.7 3.9  3.8 3.2*   CHLORIDE mmol/L 105  --  105 100   CO2 mmol/L 29.0  --  27.9 30.4*   BUN mg/dL 11  --  11 18   CREATININE mg/dL 0.78  --  0.82 0.81   GLUCOSE mg/dL 106*  --  106* 126*   EGFR mL/min/1.73 105.3  --  103.7 104.1     Results from last 7 days   Lab Units 07/28/23 0605 07/27/23 0610 07/26/23 2016   ALBUMIN g/dL 3.5 3.3* 3.8   BILIRUBIN mg/dL 0.6 0.7 0.6   ALK PHOS U/L 160* 160* 197*   AST (SGOT) U/L 35 45* 52*   ALT (SGPT) U/L 56* 65* 75*     Results from last 7 days   Lab Units 07/28/23 0605 07/27/23 0610 07/26/23 2016   CALCIUM mg/dL 8.6 8.5* 9.1   ALBUMIN g/dL 3.5 3.3* 3.8     Results from last 7 days   Lab Units 07/26/23 2017   LACTATE mmol/L 0.8     No results found for: HGBA1C, POCGLU    XR Chest 2 View    Result Date: 7/26/2023  Linear scarring versus atelectasis noted at the left lung base.  This report was finalized on 7/26/2023 9:10 PM by Dr. Betsy Grider M.D.      CT Head Without Contrast    Result Date: 7/26/2023  No acute intracranial findings.  Radiation dose reduction techniques were utilized, including automated exposure control and exposure modulation based on body size.  This report was finalized on 7/26/2023 9:08 PM by Dr. Betsy Grider M.D.      MRI Brain With & Without Contrast    Result Date: 7/28/2023  1. No acute intracranial abnormality is identified. 2. There are scattered tiny patchy nodular foci of T2 high signal in the frontoparietal white matter most consistent with mild small vessel disease. The remainder of the MRI of the brain is within normal limits. Specifically I see no evidence of encephalitis on this exam. The patient did have a lumbar puncture performed earlier this morning, correlation with that result is suggested.      IR LUMBAR PUNCTURE DIAGNOSTIC    Result Date: 7/28/2023  Technically successful fluoroscopically guided lumbar puncture.  Reference air kerma: 13.3 mGy  This report was finalized on 7/28/2023 9:34 AM by Dr. Nikolas Rosado M.D.       I  have personally reviewed all medications:  Scheduled Medications  buPROPion XL, 150 mg, Oral, Daily  doxycycline, 100 mg, Intravenous, Q12H  lisinopril, 20 mg, Oral, Daily  pantoprazole, 40 mg, Oral, Q AM  senna-docusate sodium, 2 tablet, Oral, BID  sertraline, 100 mg, Oral, Daily  sodium chloride, 10 mL, Intravenous, Q12H      Infusions  hold, 1 each      Diet  Diet: Cardiac Diets; Healthy Heart (2-3 Na+); Texture: Regular Texture (IDDSI 7); Fluid Consistency: Thin (IDDSI 0)    I have personally reviewed:  [x]  Laboratory   [x]  Microbiology   [x]  Radiology   [x]  EKG/Telemetry  []  Cardiology/Vascular   []  Pathology    []  Records      Assessment/Plan     Active Hospital Problems    Diagnosis  POA    **FUO (fever of unknown origin) [R50.9]  Yes    Rash [R21]  Yes    Elevated LFTs [R79.89]  Yes    Mixed hyperlipidemia [E78.2]  Yes    Essential hypertension [I10]  Yes    Depression [F32.A]  Yes      Resolved Hospital Problems   No resolved problems to display.       57 y.o. male admitted with FUO (fever of unknown origin).    Lyme disease: Blood test have returned positive with Lyme IgM and IgG.  He had bug bites while hiking recently but did not have any found ticks.  He has had improvement in LFTs on the doxycycline.  He did still have a fever yesterday afternoon.  Continue to monitor LP results and fever curve.  Infectious disease following.  Metabolic encephalopathy: Possibly secondary to his infectious issues and fever.  LP and MRI has been performed to check for potential encephalitis/meningitis.  Initial studies with low nucleated cells would appear to be aseptic.  Awaiting culture.  Neurology following.  Elevated LFT: Monitor with treatment of above  Hypertension: Acceptable acutely.  HLD: Statin held  Ppx: SCD  Disposition: Home/few days    Expected Discharge Date: 7/31/2023; Expected Discharge Time:      Stas Leonard MD  [facility]  07/28/23  14:51 EDT    Dictated portions of  note using dragon dictation software.  Copied text in this note has been reviewed by me and remains accurate as of 07/28/23    Electronically signed by Stas Leonard MD at 07/28/23 1509       Galdino Shafer MD at 07/28/23 1020           Chart Correction Flag was removed by Krista Pineda on 07/28/23 1047               ID NOTE    CC: f/u fever    Subj: No fever. No acute events. He has what sounds like an intentional tremor and still some word finding difficulties. Getting an LP and MRI brain done this morning. D/w his wife.     Medications:    Current Facility-Administered Medications:     acetaminophen (TYLENOL) tablet 650 mg, 650 mg, Oral, Q4H PRN, 650 mg at 07/28/23 0555 **OR** acetaminophen (TYLENOL) 160 MG/5ML solution 650 mg, 650 mg, Oral, Q4H PRN **OR** acetaminophen (TYLENOL) suppository 650 mg, 650 mg, Rectal, Q4H PRN, Chely Gardner APRN    sennosides-docusate (PERICOLACE) 8.6-50 MG per tablet 2 tablet, 2 tablet, Oral, BID **AND** polyethylene glycol (MIRALAX) packet 17 g, 17 g, Oral, Daily PRN **AND** bisacodyl (DULCOLAX) EC tablet 5 mg, 5 mg, Oral, Daily PRN **AND** bisacodyl (DULCOLAX) suppository 10 mg, 10 mg, Rectal, Daily PRN, Chely Gardner APRN    buPROPion XL (WELLBUTRIN XL) 24 hr tablet 150 mg, 150 mg, Oral, Daily, Chely Gardner APRN, 150 mg at 07/27/23 0901    Calcium Replacement - Follow Nurse / BPA Driven Protocol, , Does not apply, PRN, Chely Gardner APRN    doxycycline (VIBRAMYCIN) 100 mg in sodium chloride 0.9 % 100 mL IVPB-VTB, 100 mg, Intravenous, Q12H, Paula Nava APRN, 100 mg at 07/27/23 2304    gadobenate dimeglumine (MULTIHANCE) injection 20 mL, 20 mL, Intravenous, Once in imaging, Stas Leonard MD    Hold medication, 1 each, Does not apply, Continuous PRN, Galdino Shafer MD    lisinopril (PRINIVIL,ZESTRIL) tablet 20 mg, 20 mg, Oral, Daily, Chely Gardner APRN, 20 mg at 07/27/23 0901     Magnesium Standard Dose Replacement - Follow Nurse / BPA Driven Protocol, , Does not apply, PRN, Chely Gardner APRN    pantoprazole (PROTONIX) EC tablet 40 mg, 40 mg, Oral, Q AM, Chely Gardner APRN, 40 mg at 07/28/23 0555    Phosphorus Replacement - Follow Nurse / BPA Driven Protocol, , Does not apply, PRN, Chely Gardner APRN    Potassium Replacement - Follow Nurse / BPA Driven Protocol, , Does not apply, PRNJj Karyn Michele, APRN    sertraline (ZOLOFT) tablet 100 mg, 100 mg, Oral, Daily, Chely Gardner APRN, 100 mg at 07/27/23 0901    sodium chloride 0.9 % flush 10 mL, 10 mL, Intravenous, PRN, Shen Maciel MD    sodium chloride 0.9 % flush 10 mL, 10 mL, Intravenous, Q12H, Chely Gardner APRN, 10 mL at 07/27/23 2012    sodium chloride 0.9 % flush 10 mL, 10 mL, Intravenous, PRN, Chely Gardner APRN    sodium chloride 0.9 % infusion 40 mL, 40 mL, Intravenous, PRN, Chely Gardner APRN      Objective   Vital Signs   Temp:  [97.3 °F (36.3 °C)-100.7 °F (38.2 °C)] 97.3 °F (36.3 °C)  Heart Rate:  [69-89] 69  Resp:  [16] 16  BP: (138-159)/(79-96) 138/83    Labs:   CBC, CMP, HIV Ab, RPR, and blood cultures reviewed today  Lab Results   Component Value Date    WBC 7.32 07/28/2023    HGB 12.6 (L) 07/28/2023    HCT 37.1 (L) 07/28/2023    MCV 88.3 07/28/2023     07/28/2023       Lab Results   Component Value Date    GLUCOSE 106 (H) 07/28/2023    BUN 11 07/28/2023    CREATININE 0.78 07/28/2023    EGFRIFNONA 90 09/11/2019    EGFRIFAFRI 97 11/08/2016    BCR 14.1 07/28/2023    CO2 29.0 07/28/2023    CALCIUM 8.6 07/28/2023    PROTENTOTREF 6.3 03/10/2023    ALBUMIN 3.5 07/28/2023    LABIL2 2.2 03/10/2023    AST 35 07/28/2023    ALT 56 (H) 07/28/2023     Lab Results   Component Value Date    RPR Non-Reactive 07/27/2023     Lab Results   Component Value Date    CRP 5.30 (H) 07/27/2023     Lab Results   Component Value Date    FERRITIN 430.00 (H)  07/27/2023       HIV Ab negative  Acute Hep A/B/C negative  Lyme Ab pending  Ehrlichia PCR pending  Rickettsia PCR pending  WNV IgM and IgG ordered  UA negative LE and nitrite  Serum and Urine Histo Ag pending  Mycoplasma Ab pending  SALEEM pending  ANCA pending  ACE level pending    Lumbar puncture (7/28/23):  Cell count pending  Diff pending  Glc pending  Pro pending  WNV IgM pending  CSF PCR pending    Microbiology:  7/26 Strep A PCR: negative  7/26 COVD/Flu/RSV PCR: negative  7/26 BCx: NGTD  7/28 CSF Cx: pending    Radiology:  MRI brain pending    ASSESSMENT/PLAN:  Fever in adult  Painful, non-pruritic rash  Elevated liver enzymes  Bug bites  Headache and previous neck stiffness    His fever is improved. There is not much change to his neurologic symptoms. The cause of his presentation has not yet been determined. As mentioned yesterday, infectious and autoimmune conditions lead the differential diagnosis. D/w Dr Hodges today who ordered an MRI. I appreciate his help.     I will continue empiric doxycycline 100 mg IV q12h with duration TBD. I think we could probably change to the oral formulation in 1-2 days.     ID will follow.       Electronically signed by Galdino Shafer MD at 07/28/23 1026          Consult Notes (last 48 hours)        Tien Hodges MD at 07/28/23 0928        Consult Orders    1. Inpatient Neurology Consult General [[id number]] ordered by Mateusz Mulligan MD at 07/27/23 1815                 Neurology Consult Note    Consult Date: 7/28/2023    Referring MD: Harshil Garcia MD    Reason for Consult I have been asked to see the patient in neurological consultation to render advice and opinion regarding headache, word finding difficulty    Sanjeev Stubbs is a 57 y.o. male with hypertension, depression, otherwise healthy.  He reports that he took a hiking trip to [place] and 2 weeks later developed persistent high fevers with a generalized headache and  "associated neck pain.  That ultimately began to improve and then he developed persistent tremor and mild word finding difficulty.  He was admitted to the hospital for these fevers as well as an associated rash.  There is concern for a possible arbovirus and CSF studies have been ordered.    Past Medical History:   Diagnosis Date    Depression     Hypertension        Exam  /83 (BP Location: Left arm, Patient Position: Lying)   Pulse 69   Temp 97.3 °F (36.3 °C) (Oral)   Resp 16   Ht 182.9 cm (72\")   Wt 101 kg (223 lb 5.2 oz)   SpO2 95%   BMI 30.29 kg/m²   Gen: NAD, vitals reviewed  MS: oriented x3, recent/remote memory intact, normal attention/concentration, mild word finding difficulty, no neglect.  CN: visual acuity grossly normal, PERRL, EOMI, no facial droop, no dysarthria  Motor: 5/5 throughout upper and lower extremities, normal tone, fairly mild low amplitude high-frequency postural and kinetic tremor  Sensory: Intact to cold temperature and vibration throughout  Reflexes: 2+ throughout  DATA:    Lab Results   Component Value Date    GLUCOSE 106 (H) 07/28/2023    CALCIUM 8.6 07/28/2023     07/28/2023    K 3.7 07/28/2023    CO2 29.0 07/28/2023     07/28/2023    BUN 11 07/28/2023    CREATININE 0.78 07/28/2023    EGFRIFAFRI 97 11/08/2016    EGFRIFNONA 90 09/11/2019    BCR 14.1 07/28/2023    ANIONGAP 7.0 07/28/2023     Lab Results   Component Value Date    WBC 7.32 07/28/2023    HGB 12.6 (L) 07/28/2023    HCT 37.1 (L) 07/28/2023    MCV 88.3 07/28/2023     07/28/2023       Lab review: CBC, BMP unremarkable    Imaging review: MRI brain with and without contrast ordered    Diagnoses:  Aseptic meningitis  Postural tremor  Word-finding difficulty    Comment: Presumed aseptic meningitis/encephalitis, possibly arboviral.  Given his word finding difficulty I think an MRI of the brain is indicated to exclude any structural brain abnormality.    PLAN:  Agree with ID work-up and plan  Adding " "contrasted brain MRI    Discussed with Dr. Shafer this morning        Electronically signed by Tien Hodges MD at 07/28/23 0931       Galdino Shafer MD at 07/27/23 1021        Consult Orders    1. Inpatient Infectious Diseases Consult [[id number]] ordered by Paula Nava APRN at 07/27/23 0829                 Referring Provider: Dr Mulligan    Reason for Consultation: fever and rash    History of present illness:  Sanjeev Stubbs is a 57 y.o. who I am asked to evaluate and give opinion for \"fever and rash.\" History is obtained from the patient, his wife, and review of the old medical records which I summarize/synthesize as follows: He presented to the ER on 7/26/23 (yesterday) with about two weeks of fever, body aches, joint pains, patchy non-pruritic rash, word finding difficulty, and tremors. He's had some occasional loose stools and neck stiffness.  He initially went to urgent care and tested negative and was recommended to treat his symptoms w/ OTC medications. However, his symptoms progressed so he came in yesterday for evaluation.     Just prior to onset of symptoms, he had been doing some hiking in [place] on the [place] near [facility] and in some swampy areas. He had multiple bug bites. No history of serious or unusual infections as an adult. He works as a . No contact w/ young/sick children. No other travel besides [place] other than going to visit his son in [place] a year ago. No hardware in the body. He did start a PPI about a week prior to symptom onset. No recent mouth pain or dental work.     In the ER, he was initially febrile but this morning had a fever to 101.7 F. Labs notable for WBC 9, Plt 362, ALT 75, AST 52, and a UA negative for LE and nitrites. CT head and CXR didn't show any acute process. He was started on IV doxycycline. ID asked to evaluate.     Past Medical History:   Diagnosis Date    Depression     Hypertension        Past " Surgical History:   Procedure Laterality Date    ANKLE SURGERY Left     COLONOSCOPY      COLONOSCOPY N/A 4/19/2023    Procedure: COLONOSCOPY FOR SCREENING;  Surgeon: Dax Colon MD;  [place] [facility] [place]  Service: Gastroenterology;  Laterality: N/A;  polyps    ENDOSCOPY N/A 7/5/2023    Procedure: ESOPHAGOGASTRODUODENOSCOPY with possible esophageal dilation;  Surgeon: Dax Colon MD;  [place] [facility] [place];  Service: Gastroenterology;  Laterality: N/A;  gastric polyps, dilated to 15 mm, esophagitis and stricture       Social History:  Lives in [place], [place]  Works for [employer] as a     Antibiotic allergies and intolerances:  None    Medications:    Current Facility-Administered Medications:     acetaminophen (TYLENOL) tablet 650 mg, 650 mg, Oral, Q4H PRN, 650 mg at 07/27/23 0905 **OR** acetaminophen (TYLENOL) 160 MG/5ML solution 650 mg, 650 mg, Oral, Q4H PRN **OR** acetaminophen (TYLENOL) suppository 650 mg, 650 mg, Rectal, Q4H PRN, Chely Gardner APRN    atorvastatin (LIPITOR) tablet 20 mg, 20 mg, Oral, Daily, Chely Gardner APRN, 20 mg at 07/27/23 0901    sennosides-docusate (PERICOLACE) 8.6-50 MG per tablet 2 tablet, 2 tablet, Oral, BID **AND** polyethylene glycol (MIRALAX) packet 17 g, 17 g, Oral, Daily PRN **AND** bisacodyl (DULCOLAX) EC tablet 5 mg, 5 mg, Oral, Daily PRN **AND** bisacodyl (DULCOLAX) suppository 10 mg, 10 mg, Rectal, Daily PRN, Chely Gardner APRN    buPROPion XL (WELLBUTRIN XL) 24 hr tablet 150 mg, 150 mg, Oral, Daily, Chely Gardner APRN, 150 mg at 07/27/23 0901    Calcium Replacement - Follow Nurse / BPA Driven Protocol, , Does not apply, PRN, Chely Gardner APRN    doxycycline (VIBRAMYCIN) 100 mg in sodium chloride 0.9 % 100 mL IVPB-VTB, 100 mg, Intravenous, Q12H, Paula Nava APRN    lisinopril (PRINIVIL,ZESTRIL) tablet 20 mg, 20 mg, Oral, Daily, Chely Gardner APRN, 20 mg at 07/27/23  0901    Magnesium Standard Dose Replacement - Follow Nurse / BPA Driven Protocol, , Does not apply, PRN, Chely Gardner APRN    pantoprazole (PROTONIX) EC tablet 40 mg, 40 mg, Oral, Q AM, Chely Gardner APRN, 40 mg at 07/27/23 0606    Phosphorus Replacement - Follow Nurse / BPA Driven Protocol, , Does not apply, PRN, Chely Gardner APRN    Potassium Replacement - Follow Nurse / BPA Driven Protocol, , Does not apply, PRN, Chely Gardner APRN    sertraline (ZOLOFT) tablet 100 mg, 100 mg, Oral, Daily, Chely Gardner APRN, 100 mg at 07/27/23 0901    sodium chloride 0.9 % flush 10 mL, 10 mL, Intravenous, PRN, Shen Maciel MD    sodium chloride 0.9 % flush 10 mL, 10 mL, Intravenous, Q12H, Chely Gardner APRN, 10 mL at 07/27/23 0900    sodium chloride 0.9 % flush 10 mL, 10 mL, Intravenous, PRN, Chely Gardner APRN    sodium chloride 0.9 % infusion 40 mL, 40 mL, Intravenous, PRN, Chely Gardner APRN      Objective   Vital Signs   Temp:  [98.5 °F (36.9 °C)-101.7 °F (38.7 °C)] 98.7 °F (37.1 °C)  Heart Rate:  [85-98] 91  Resp:  [16] 16  BP: (129-164)/(70-98) 147/84    Physical Exam:   General: awake, alert, very nice, mildly anxious   Eyes: no scleral icterus  ENT: no thrush  Neck: supple w/o meningismus (he says this has improved)  Cardiovascular: NR; no murmur  Respiratory: normal work of breathing on ambient air  GI: Abdomen is soft, not tender, + bowel sounds in all four quadrants  :  no Presley catheter  Skin: flat non pruritic rash scattered around entire body  Neurological: Alert and oriented x 3  Psychiatric: Normal mood and affect   Vasc: PIV w/o erythema    Labs:     Lab Results   Component Value Date    WBC 11.95 (H) 07/27/2023    HGB 12.8 (L) 07/27/2023    HCT 38.9 07/27/2023    MCV 89.4 07/27/2023     07/27/2023       Lab Results   Component Value Date    GLUCOSE 106 (H) 07/27/2023    BUN 11 07/27/2023    CREATININE 0.82  07/27/2023    EGFRIFNONA 90 09/11/2019    EGFRIFAFRI 97 11/08/2016    BCR 13.4 07/27/2023    CO2 27.9 07/27/2023    CALCIUM 8.5 (L) 07/27/2023    PROTENTOTREF 6.3 03/10/2023    ALBUMIN 3.3 (L) 07/27/2023    LABIL2 2.2 03/10/2023    AST 45 (H) 07/27/2023    ALT 65 (H) 07/27/2023     Lyme Ab pending  Ehrlichia PCR pending  Rickettsia PCR pending  WNV IgM and IgG ordered  RPR ordered  LA 0.8  UA negative LE and nitrite    Microbiology:  7/26 Strep A PCR: negative  7/26 COVD/Flu/RSV PCR: negative  7/26 BCx: pending    Radiology:  CT head no acute process  CXR with atelectasis in the LLL; no pneumonia on my read    ASSESSMENT/PLAN:  Fever in adult  Painful, non-pruritic rash  Elevated liver enzymes  Bug bites  Headache and previous neck stiffness    I think an infectious or inflammatory disease (ie autoimmune illness) is the most likely cause of his symptoms. The differential is wide so will send a broad workup. His rash has some characteristics of erythema nodosum; however, it is in more places than just the shins where it is typically seen.     I will follow-up his tests for Ehrlichia, Rickettsial infection, and Lyme (less likely). I will continue empiric doxycycline. Normal/elevated WBC and normal platelets would argue against tick-borne illness but still possible.     Check WNV IgM and IgG, RPR, SALEEM, ANCA, ACE level, ferritin, ESR, CRP, Histo studies, Mycoplasma, HIV Ab, and an RPP. Given fever, headache and neck stiffness, will also get an LP as something like Enterovirus could be the culprit especially w/ his GI symptoms.     ID will follow. Case and its management discussed with Dr Mulligan.       Electronically signed by Galdino Shafer MD at 07/27/23 6836

## 2023-07-28 NOTE — PLAN OF CARE
Goal Outcome Evaluation:        Problem: Adult Inpatient Plan of Care  Goal: Plan of Care Review  Outcome: Ongoing, Progressing  Goal: Patient-Specific Goal (Individualized)  Outcome: Ongoing, Progressing  Goal: Absence of Hospital-Acquired Illness or Injury  Outcome: Ongoing, Progressing  Intervention: Identify and Manage Fall Risk  Recent Flowsheet Documentation  Taken 7/28/2023 1419 by Graciela Hull RN  Safety Promotion/Fall Prevention:   safety round/check completed   room organization consistent   nonskid shoes/slippers when out of bed   lighting adjusted   fall prevention program maintained   clutter free environment maintained   assistive device/personal items within reach  Taken 7/28/2023 1200 by Graciela Hull RN  Safety Promotion/Fall Prevention:   assistive device/personal items within reach   clutter free environment maintained   fall prevention program maintained   lighting adjusted   nonskid shoes/slippers when out of bed   room organization consistent   safety round/check completed  Taken 7/28/2023 1012 by Graciela Hull RN  Safety Promotion/Fall Prevention: patient off unit  Taken 7/28/2023 0800 by Graciela Hull RN  Safety Promotion/Fall Prevention:   safety round/check completed   room organization consistent   nonskid shoes/slippers when out of bed   lighting adjusted   fall prevention program maintained   elopement precautions   assistive device/personal items within reach  Intervention: Prevent Skin Injury  Recent Flowsheet Documentation  Taken 7/28/2023 1419 by Graciela Hull RN  Body Position: supine  Taken 7/28/2023 1200 by Graciela Hull RN  Body Position: supine  Taken 7/28/2023 0800 by Graciela Hull RN  Body Position: (sitting in chair) other (see comments)  Intervention: Prevent Infection  Recent Flowsheet Documentation  Taken 7/28/2023 0800 by Graciela Hull RN  Infection Prevention:   single patient room provided   rest/sleep promoted   hand hygiene promoted    equipment surfaces disinfected   environmental surveillance performed  Goal: Optimal Comfort and Wellbeing  Outcome: Ongoing, Progressing  Intervention: Provide Person-Centered Care  Recent Flowsheet Documentation  Taken 7/28/2023 0800 by Graciela Hull RN  Trust Relationship/Rapport:   care explained   choices provided   emotional support provided   empathic listening provided   questions answered   questions encouraged   reassurance provided   thoughts/feelings acknowledged  Goal: Readiness for Transition of Care  Outcome: Ongoing, Progressing     Problem: Pain Acute  Goal: Acceptable Pain Control and Functional Ability  Outcome: Ongoing, Progressing  Intervention: Prevent or Manage Pain  Recent Flowsheet Documentation  Taken 7/28/2023 1419 by Graciela Hull, RN  Medication Review/Management: medications reviewed  Taken 7/28/2023 1200 by Graciela Hull, RN  Medication Review/Management: medications reviewed  Taken 7/28/2023 0800 by Graciela Hull, RN  Medication Review/Management: medications reviewed

## 2023-07-28 NOTE — PROGRESS NOTES
ID NOTE    CC: f/u fever    Subj: No fever. No acute events. He has what sounds like an intentional tremor and still some word finding difficulties. Getting an LP and MRI brain done this morning. D/w his wife.     Medications:    Current Facility-Administered Medications:     acetaminophen (TYLENOL) tablet 650 mg, 650 mg, Oral, Q4H PRN, 650 mg at 07/28/23 0555 **OR** acetaminophen (TYLENOL) 160 MG/5ML solution 650 mg, 650 mg, Oral, Q4H PRN **OR** acetaminophen (TYLENOL) suppository 650 mg, 650 mg, Rectal, Q4H PRN, Chely Gardner APRN    sennosides-docusate (PERICOLACE) 8.6-50 MG per tablet 2 tablet, 2 tablet, Oral, BID **AND** polyethylene glycol (MIRALAX) packet 17 g, 17 g, Oral, Daily PRN **AND** bisacodyl (DULCOLAX) EC tablet 5 mg, 5 mg, Oral, Daily PRN **AND** bisacodyl (DULCOLAX) suppository 10 mg, 10 mg, Rectal, Daily PRN, Chely Gardner APRN    buPROPion XL (WELLBUTRIN XL) 24 hr tablet 150 mg, 150 mg, Oral, Daily, Chely Gardner APRN, 150 mg at 07/27/23 0901    Calcium Replacement - Follow Nurse / BPA Driven Protocol, , Does not apply, PRN, Chely Gardner APRN    doxycycline (VIBRAMYCIN) 100 mg in sodium chloride 0.9 % 100 mL IVPB-VTB, 100 mg, Intravenous, Q12H, Paula Nava APRN, 100 mg at 07/27/23 2304    gadobenate dimeglumine (MULTIHANCE) injection 20 mL, 20 mL, Intravenous, Once in imaging, Stas Leonard MD    Hold medication, 1 each, Does not apply, Continuous PRN, Galdino Shafer MD    lisinopril (PRINIVIL,ZESTRIL) tablet 20 mg, 20 mg, Oral, Daily, Chely Gardner APRN, 20 mg at 07/27/23 0901    Magnesium Standard Dose Replacement - Follow Nurse / BPA Driven Protocol, , Does not apply, Jj DUARTE Karyn Michele, APRN    pantoprazole (PROTONIX) EC tablet 40 mg, 40 mg, Oral, Q AM, Chely Gardner APRN, 40 mg at 07/28/23 0555    Phosphorus Replacement - Follow Nurse / BPA Driven Protocol, , Does not apply, PRN, Gardner,  VALERIE Byers    Potassium Replacement - Follow Nurse / BPA Driven Protocol, , Does not apply, PRN, Chely Gardner APRN    sertraline (ZOLOFT) tablet 100 mg, 100 mg, Oral, Daily, Chely Gardner APRN, 100 mg at 07/27/23 0901    sodium chloride 0.9 % flush 10 mL, 10 mL, Intravenous, PRN, Shen Maceil MD    sodium chloride 0.9 % flush 10 mL, 10 mL, Intravenous, Q12H, Chely Gardner APRN, 10 mL at 07/27/23 2012    sodium chloride 0.9 % flush 10 mL, 10 mL, Intravenous, PRN, Chely Gardner APRN    sodium chloride 0.9 % infusion 40 mL, 40 mL, Intravenous, PRN, Chely Gardner APRN      Objective   Vital Signs   Temp:  [97.3 °F (36.3 °C)-100.7 °F (38.2 °C)] 97.3 °F (36.3 °C)  Heart Rate:  [69-89] 69  Resp:  [16] 16  BP: (138-159)/(79-96) 138/83    Labs:   CBC, CMP, HIV Ab, RPR, and blood cultures reviewed today  Lab Results   Component Value Date    WBC 7.32 07/28/2023    HGB 12.6 (L) 07/28/2023    HCT 37.1 (L) 07/28/2023    MCV 88.3 07/28/2023     07/28/2023       Lab Results   Component Value Date    GLUCOSE 106 (H) 07/28/2023    BUN 11 07/28/2023    CREATININE 0.78 07/28/2023    EGFRIFNONA 90 09/11/2019    EGFRIFAFRI 97 11/08/2016    BCR 14.1 07/28/2023    CO2 29.0 07/28/2023    CALCIUM 8.6 07/28/2023    PROTENTOTREF 6.3 03/10/2023    ALBUMIN 3.5 07/28/2023    LABIL2 2.2 03/10/2023    AST 35 07/28/2023    ALT 56 (H) 07/28/2023     Lab Results   Component Value Date    RPR Non-Reactive 07/27/2023     Lab Results   Component Value Date    CRP 5.30 (H) 07/27/2023     Lab Results   Component Value Date    FERRITIN 430.00 (H) 07/27/2023       HIV Ab negative  Acute Hep A/B/C negative  Lyme Ab pending  Ehrlichia PCR pending  Rickettsia PCR pending  WNV IgM and IgG ordered  UA negative LE and nitrite  Serum and Urine Histo Ag pending  Mycoplasma Ab pending  SALEEM pending  ANCA pending  ACE level pending    Lumbar puncture (7/28/23):  Cell count pending  Diff  pending  Glc pending  Pro pending  WNV IgM pending  CSF PCR pending    Microbiology:  7/26 Strep A PCR: negative  7/26 COVD/Flu/RSV PCR: negative  7/26 BCx: NGTD  7/28 CSF Cx: pending    Radiology:  MRI brain pending    ASSESSMENT/PLAN:  Fever in adult  Painful, non-pruritic rash  Elevated liver enzymes  Bug bites  Headache and previous neck stiffness    His fever is improved. There is not much change to his neurologic symptoms. The cause of his presentation has not yet been determined. As mentioned yesterday, infectious and autoimmune conditions lead the differential diagnosis. D/w Dr Hodges today who ordered an MRI. I appreciate his help.     I will continue empiric doxycycline 100 mg IV q12h with duration TBD. I think we could probably change to the oral formulation in 1-2 days.     ID will follow.

## 2023-07-29 ENCOUNTER — READMISSION MANAGEMENT (OUTPATIENT)
Dept: CALL CENTER | Facility: HOSPITAL | Age: 58
End: 2023-07-29
Payer: COMMERCIAL

## 2023-07-29 VITALS
WEIGHT: 223.33 LBS | HEIGHT: 72 IN | TEMPERATURE: 98 F | SYSTOLIC BLOOD PRESSURE: 142 MMHG | DIASTOLIC BLOOD PRESSURE: 85 MMHG | OXYGEN SATURATION: 94 % | HEART RATE: 87 BPM | RESPIRATION RATE: 18 BRPM | BODY MASS INDEX: 30.25 KG/M2

## 2023-07-29 LAB
ALBUMIN SERPL-MCNC: 3.4 G/DL (ref 3.5–5.2)
ALBUMIN/GLOB SERPL: 1.1 G/DL
ALP SERPL-CCNC: 146 U/L (ref 39–117)
ALT SERPL W P-5'-P-CCNC: 51 U/L (ref 1–41)
ANION GAP SERPL CALCULATED.3IONS-SCNC: 12.1 MMOL/L (ref 5–15)
AST SERPL-CCNC: 25 U/L (ref 1–40)
BASOPHILS # BLD AUTO: 0.05 10*3/MM3 (ref 0–0.2)
BASOPHILS NFR BLD AUTO: 0.6 % (ref 0–1.5)
BILIRUB SERPL-MCNC: 0.5 MG/DL (ref 0–1.2)
BUN SERPL-MCNC: 15 MG/DL (ref 6–20)
BUN/CREAT SERPL: 17.2 (ref 7–25)
CALCIUM SPEC-SCNC: 9 MG/DL (ref 8.6–10.5)
CHLORIDE SERPL-SCNC: 104 MMOL/L (ref 98–107)
CO2 SERPL-SCNC: 26.9 MMOL/L (ref 22–29)
CREAT SERPL-MCNC: 0.87 MG/DL (ref 0.76–1.27)
DEPRECATED RDW RBC AUTO: 41.1 FL (ref 37–54)
EGFRCR SERPLBLD CKD-EPI 2021: 101.9 ML/MIN/1.73
EOSINOPHIL # BLD AUTO: 0.17 10*3/MM3 (ref 0–0.4)
EOSINOPHIL NFR BLD AUTO: 2.1 % (ref 0.3–6.2)
ERYTHROCYTE [DISTWIDTH] IN BLOOD BY AUTOMATED COUNT: 13 % (ref 12.3–15.4)
GLOBULIN UR ELPH-MCNC: 3.1 GM/DL
GLUCOSE SERPL-MCNC: 104 MG/DL (ref 65–99)
HCT VFR BLD AUTO: 40.1 % (ref 37.5–51)
HGB BLD-MCNC: 13.7 G/DL (ref 13–17.7)
IMM GRANULOCYTES # BLD AUTO: 0.19 10*3/MM3 (ref 0–0.05)
IMM GRANULOCYTES NFR BLD AUTO: 2.3 % (ref 0–0.5)
LYMPHOCYTES # BLD AUTO: 1.23 10*3/MM3 (ref 0.7–3.1)
LYMPHOCYTES NFR BLD AUTO: 15 % (ref 19.6–45.3)
MAGNESIUM SERPL-MCNC: 2.4 MG/DL (ref 1.6–2.6)
MCH RBC QN AUTO: 29.8 PG (ref 26.6–33)
MCHC RBC AUTO-ENTMCNC: 34.2 G/DL (ref 31.5–35.7)
MCV RBC AUTO: 87.4 FL (ref 79–97)
MONOCYTES # BLD AUTO: 0.66 10*3/MM3 (ref 0.1–0.9)
MONOCYTES NFR BLD AUTO: 8.1 % (ref 5–12)
NEUTROPHILS NFR BLD AUTO: 5.88 10*3/MM3 (ref 1.7–7)
NEUTROPHILS NFR BLD AUTO: 71.9 % (ref 42.7–76)
NRBC BLD AUTO-RTO: 0 /100 WBC (ref 0–0.2)
PLATELET # BLD AUTO: 371 10*3/MM3 (ref 140–450)
PMV BLD AUTO: 9.3 FL (ref 6–12)
POTASSIUM SERPL-SCNC: 4 MMOL/L (ref 3.5–5.2)
PROT SERPL-MCNC: 6.5 G/DL (ref 6–8.5)
RBC # BLD AUTO: 4.59 10*6/MM3 (ref 4.14–5.8)
SODIUM SERPL-SCNC: 143 MMOL/L (ref 136–145)
WBC NRBC COR # BLD: 8.18 10*3/MM3 (ref 3.4–10.8)
WNV IGG SER QL IA: NEGATIVE
WNV IGM SER QL IA: NEGATIVE

## 2023-07-29 PROCEDURE — 85025 COMPLETE CBC W/AUTO DIFF WBC: CPT | Performed by: INTERNAL MEDICINE

## 2023-07-29 PROCEDURE — 99232 SBSQ HOSP IP/OBS MODERATE 35: CPT

## 2023-07-29 PROCEDURE — 99233 SBSQ HOSP IP/OBS HIGH 50: CPT | Performed by: STUDENT IN AN ORGANIZED HEALTH CARE EDUCATION/TRAINING PROGRAM

## 2023-07-29 PROCEDURE — 80053 COMPREHEN METABOLIC PANEL: CPT | Performed by: INTERNAL MEDICINE

## 2023-07-29 PROCEDURE — 83735 ASSAY OF MAGNESIUM: CPT | Performed by: INTERNAL MEDICINE

## 2023-07-29 RX ORDER — DOXYCYCLINE 100 MG/1
100 CAPSULE ORAL 2 TIMES DAILY
Qty: 22 CAPSULE | Refills: 0 | Status: SHIPPED | OUTPATIENT
Start: 2023-07-29 | End: 2023-08-09

## 2023-07-29 RX ORDER — DOXYCYCLINE 100 MG/1
100 CAPSULE ORAL EVERY 12 HOURS SCHEDULED
Status: DISCONTINUED | OUTPATIENT
Start: 2023-07-29 | End: 2023-07-29 | Stop reason: HOSPADM

## 2023-07-29 RX ADMIN — SERTRALINE 100 MG: 100 TABLET, FILM COATED ORAL at 09:40

## 2023-07-29 RX ADMIN — DOXYCYCLINE 100 MG: 100 CAPSULE ORAL at 11:20

## 2023-07-29 RX ADMIN — PANTOPRAZOLE SODIUM 40 MG: 40 TABLET, DELAYED RELEASE ORAL at 05:13

## 2023-07-29 RX ADMIN — ACETAMINOPHEN 650 MG: 325 TABLET, FILM COATED ORAL at 05:14

## 2023-07-29 RX ADMIN — BUPROPION HYDROCHLORIDE 150 MG: 150 TABLET, EXTENDED RELEASE ORAL at 09:40

## 2023-07-29 RX ADMIN — LISINOPRIL 20 MG: 20 TABLET ORAL at 09:40

## 2023-07-29 NOTE — PROGRESS NOTES
LOS: 2 days     Chief Complaint: Fever    Interval History: Fever curve improved and overall patient reports he is improving symptomatically.  Rashes improved and disappearing.  Tolerating doxycycline well.    Vital Signs  Temp:  [97.3 °F (36.3 °C)-98.2 °F (36.8 °C)] 97.8 °F (36.6 °C)  Heart Rate:  [68-92] 81  Resp:  [16-18] 16  BP: (130-159)/(73-94) 130/73    Physical Exam:  General: In no acute distress  HEENT: Oropharynx clear, moist mucous membranes  Respiratory: Normal work of breathing  Skin: Fading blotchy erythematous rash over trunk and extremities.  Extremities: No edema, cyanosis  Access: Peripheral IV    Antibiotics:  Anti-Infectives (From admission, onward)      Ordered     Dose/Rate Route Frequency Start Stop    07/29/23 0949  doxycycline (MONODOX) capsule 100 mg        Ordering Provider: Yemi Hui,     100 mg Oral Every 12 Hours Scheduled 07/29/23 1045 08/06/23 0859    07/26/23 2230  doxycycline (VIBRAMYCIN) 100 mg in sodium chloride 0.9 % 100 mL IVPB-VTB        Ordering Provider: Shen Maciel MD    100 mg  100 mL/hr over 60 Minutes Intravenous Once 07/26/23 2300 07/26/23 2359             Results Review:     I reviewed the patient's new clinical results.    Lab Results   Component Value Date    WBC 8.18 07/29/2023    HGB 13.7 07/29/2023    HCT 40.1 07/29/2023    MCV 87.4 07/29/2023     07/29/2023     Lab Results   Component Value Date    GLUCOSE 104 (H) 07/29/2023    BUN 15 07/29/2023    CREATININE 0.87 07/29/2023    EGFRIFNONA 90 09/11/2019    EGFRIFAFRI 97 11/08/2016    BCR 17.2 07/29/2023    CO2 26.9 07/29/2023    CALCIUM 9.0 07/29/2023    PROTENTOTREF 6.3 03/10/2023    ALBUMIN 3.4 (L) 07/29/2023    LABIL2 2.2 03/10/2023    AST 25 07/29/2023    ALT 51 (H) 07/29/2023     HIV Ab negative  Acute Hep A/B/C negative  Lyme Ab positive  Ehrlichia PCR pending  Rickettsia PCR pending  WNV IgM and IgG pending  UA negative LE and nitrite  Serum and Urine Histo Ag  pending  Mycoplasma Ab positive  SALEEM negative  ANCA negative  ACE level within normal limits  RPR negative    Lumbar puncture (7/28/23):  Component      Latest Ref Rng 7/28/2023   Color, CSF      Colorless  Colorless    Color, CSF      Colorless  Colorless    Appearance, CSF      Clear  Clear    Appearance, CSF      Clear  Clear    RBC, CSF      0 - 0 /mm3 47 (H)    RBC, CSF      0 - 0 /mm3 16 (H)    Total Nucleated Cells      0 - 5 /mm3 1    Total Nucleated Cells      0 - 5 /mm3 2    Tube Number, CSF 1    Tube Number, CSF 4    Method: UF 1000i Automated Method    Method: UF 1000i Automated Method    Glucose, CSF      40 - 70 mg/dL 63    Protein, Total (CSF)      15.0 - 45.0 mg/dL 34.1       (H) High  7/28 meningitis encephalitis panel negative    Microbiology:  7/26 Strep A PCR: negative  7/26 COVD/Flu/RSV PCR: negative  7/26 BCx: NGTD  7/28 CSF Cx: No growth to date    Assessment    Fever in adult  Painful, non-pruritic rash  Elevated liver enzymes  Bug bites  Headache and previous neck stiffness  (All new to me today)     Patient improved on Doxy therapy with resolution of fevers.  So far laboratory work-up has been unremarkable other than positive antibodies for Lyme and mycoplasma.  These are difficult to interpret however doxycycline would be adequate therapy.  Lumbar puncture is unremarkable and not consistent with encephalitis or meningitis.      Recommend completing out 10-day course of doxycycline 100 mg twice daily.  Discussed with the patient that diagnosis may remain elusive however his clinical improvement is the main goal and empiric therapy has shown improvement.  Okay with discharge from an ID perspective once okay with other services.      Thank you for allowing me to be involved in the care of this patient. Infectious diseases will sign off at this time with antibiotics plan in place, but please call me at [phone] if any further ID questions or new ID concerns.

## 2023-07-29 NOTE — OUTREACH NOTE
Prep Survey      Flowsheet Row Responses   [facility] patient discharged from? [place]   Is LACE score < 7 ? Yes   Eligibility [facility]   Date of Admission 07/26/23   Date of Discharge 07/29/23   Discharge Disposition Home or Self Care   Discharge diagnosis **Lyme disease   Does the patient have one of the following disease processes/diagnoses(primary or secondary)? Other   Does the patient have Home health ordered? No   Is there a DME ordered? No   Prep survey completed? Yes            AUDRA LAUREANO - Registered Nurse

## 2023-07-29 NOTE — DISCHARGE SUMMARY
Date of Admission: 7/26/2023  Date of Discharge:  7/29/2023  Primary Care Physician: Harshil Garcia MD     Discharge Diagnosis:  Active Hospital Problems    Diagnosis  POA    **Lyme disease [A69.20]  Yes    Rash [R21]  Yes    Elevated LFTs [R79.89]  Yes    Mixed hyperlipidemia [E78.2]  Yes    Essential hypertension [I10]  Yes    Depression [F32.A]  Yes      Resolved Hospital Problems   No resolved problems to display.       Presenting Problem/History of Present Illness from H&P:  FUO (fever of unknown origin) [R50.9]  Word finding difficulty [R47.89]     Mr. Stubbs is a 57 y.o. non-smoker with a history of HTN, depression that presents to [facility] complaining of fevers, rash, tremors, speech difficulty. Traveled a few weeks ago that involved hiking. A few days after returning home, he started w/fevers and myalgias. He was seen at  on 7/15/23, thought to have a viral illness w/recommendation to treat symptoms. Since then he has had nearly daily fevers and over the past 2-3 days has had tremors that make writing difficult and has had word finding difficulty, noticed by colleagues. He has had frequent headaches. He was having neck and jaw pain that has improved. Denies sore throat, cough, runny nose, soa, chest pain, n/v/d, dysuria, impaired gait/balance. He has also developed a red circular rash on all extremities and trunk; denies itching, pain.     Hospital Course:  The patient is a 57 y.o. male who presented with fever and confusion. He was admitted and underwent evaluation by ID and neurology services. Lyme IgM and IgG returned positive. He did have some rash (improving now) and elevated LFTs. The fever improved on doxycycline and he is going to transition to oral doxycycline to complete his antibiotic course. He did undergo lumbar puncture. CSF studies were not consistent with bacterial infection. PCR encephalitis panel from CSF was negative. [place] and cytology are still pending.  Monitor those results at follow up. He reports he has primary care follow up planned for later this week.    Exam Today:  Constitutional:       General: He is not in acute distress.     Appearance: He is not diaphoretic.   HENT:      Head: Atraumatic.   Eyes:      General: No scleral icterus.     Conjunctiva/sclera: Conjunctivae normal.   Cardiovascular:      Rate and Rhythm: Normal rate and regular rhythm.      Pulses: Normal pulses.   Pulmonary:      Effort: Pulmonary effort is normal.      Breath sounds: No wheezing.   Abdominal:      General: There is no distension.      Palpations: Abdomen is soft.      Tenderness: There is no abdominal tenderness. There is no guarding or rebound.   Musculoskeletal:         General: No swelling or tenderness.   Skin:     General: Skin is warm and dry.   Neurological:      Mental Status: He is alert.      Comments: cn2-12 grossly intact   Psychiatric:         Mood and Affect: Mood normal.         Behavior: Behavior normal.     Results:  CXR  Linear scarring versus atelectasis noted at the left lung base.     CT Head  No acute intracranial findings.     MRI Brain  1. No acute intracranial abnormality is identified.  2. There are scattered tiny patchy nodular foci of T2 high signal in the  frontoparietal white matter most consistent with mild small vessel  disease. The remainder of the MRI of the brain is within normal limits.  Specifically I see no evidence of encephalitis on this exam. The patient  did have a lumbar puncture performed earlier this morning, correlation  with that result is suggested.    Procedures Performed:         Consults:   Consults       Date and Time Order Name Status Description    7/27/2023  6:15 PM Inpatient Neurology Consult General Completed     7/27/2023  8:29 AM Inpatient Infectious Diseases Consult Completed     7/26/2023  9:42 PM [facility] (on-call MD unless specified) Details               Discharge Disposition:  Home or Self Care    Discharge  Medications:     Discharge Medications        New Medications        Instructions Start Date   doxycycline 100 MG capsule  Commonly known as: MONODOX   100 mg, Oral, 2 Times Daily             Continue These Medications        Instructions Start Date   acetaminophen 325 MG tablet  Commonly known as: TYLENOL   650 mg, Oral, Every 6 Hours PRN      atorvastatin 20 MG tablet  Commonly known as: LIPITOR   20 mg, Oral, Daily      buPROPion  MG 24 hr tablet  Commonly known as: WELLBUTRIN XL   150 mg, Oral, Every Morning      lisinopril 20 MG tablet  Commonly known as: PRINIVIL,ZESTRIL   20 mg, Oral, Daily      omeprazole 40 MG capsule  Commonly known as: priLOSEC   40 mg, Oral, 2 Times Daily      sertraline 100 MG tablet  Commonly known as: ZOLOFT   100 mg, Oral, Daily               Discharge Diet:   Diet Instructions       Advance Diet As Tolerated -Target Diet: home diet      Target Diet: home diet            Activity at Discharge:   Activity Instructions       Activity as Tolerated              Follow-up Appointments:   Follow-up Information       Harshil Garcia MD Follow up.    Specialty: Family Medicine  Contact information:  [address] [address]  [phone]                             Test Results Pending at Discharge:  Pending Labs       Order Current Status    Ehrlichia Profile DNA PCR In process    Histoplasma Ag Ur - Urine, Urine, Clean Catch In process    Histoplasma Antigen ser In process    Non-gynecologic Cytology In process    [place] Draw In process    Rickettsia Species DNA, Real-Time PCR In process    [place] Antibodies, IgG & IgM In process    [place] Virus, CSF In process    Blood Culture - Blood, Arm, Right Preliminary result    Blood Culture - Blood, Arm, Right Preliminary result    Culture, CSF - Cerebrospinal Fluid, Lumbar Puncture Preliminary result             Stas Leonard MD  07/29/23  10:26 EDT    Time Spent on Discharge Activities: >30  minutes    Dictated portions using Dragon dictation software.

## 2023-07-29 NOTE — PROGRESS NOTES
"DOS: 2023  NAME: Sanjeev Stubbs   : 1965  PCP: Harshil Garcia MD  Chief Complaint   Patient presents with    Speech Problem    Tremors     During triage multiple circular macular red areas over body,and extremities      Neurology    Subjective:     Interval History  Pt seen in follow up today, however the problem is new to the examiner.  Patient Complaints:  No acute events overnight.  Patient is dressed and sitting in recliner chair, states he feels pretty good.  Wants to discharge home.  History taken from: patient chart    Objective:  Vital signs: /73 (BP Location: Left arm)   Pulse 81   Temp 97.8 °F (36.6 °C) (Oral)   Resp 16   Ht 182.9 cm (72\")   Wt 101 kg (223 lb 5.2 oz)   SpO2 94%   BMI 30.29 kg/m²       Physical Exam:  GENERAL: NAD, alert and cooperative  HEENT: Normocephalic, atraumatic   Resp: Even and unlabored  Extremities: No signs of distal embolization. Normal joint ROM  Skin: Warm and dry. Macular fading pink/red areas scattered throughout torso and extremities.  Psychiatric: Good mood, normal affect. Thought process normal.    Neurological:   MS: AOx3, recent/remote memory intact, normal attention/concentration, language intact, no neglect   CN: visual acuity grossly normal, PERRL, EOMI, no nystagmus, no facial droop, no dysarthria, hearing symmetric, tongue midline  Motor: 5/5 strength in all 4 ext. Normal tone. Subtle action tremor noted R>L  Sensory: Intact to light touch in all four ext.  Gait and station: Normal gait and station  Reflexes: 2+ throughout all 4 ext.     Results Review: I have reviewed MRI brain and see no acute stroke   I reviewed the patient's new clinical results.  I reviewed the patient's new imaging results and agree with the interpretation.  I reviewed the patient's other test results and agree with the interpretation  Discussed with Dr. Multani.    Current Medications:  Scheduled Medications:buPROPion XL, 150 mg, Oral, Daily  doxycycline, 100 " mg, Intravenous, Q12H  lisinopril, 20 mg, Oral, Daily  pantoprazole, 40 mg, Oral, Q AM  senna-docusate sodium, 2 tablet, Oral, BID  sertraline, 100 mg, Oral, Daily  sodium chloride, 10 mL, Intravenous, Q12H      Infusions: hold, 1 each      PRN Medications:    acetaminophen **OR** acetaminophen **OR** acetaminophen    senna-docusate sodium **AND** polyethylene glycol **AND** bisacodyl **AND** bisacodyl    Calcium Replacement - Follow Nurse / BPA Driven Protocol    hold    Magnesium Standard Dose Replacement - Follow Nurse / BPA Driven Protocol    Phosphorus Replacement - Follow Nurse / BPA Driven Protocol    Potassium Replacement - Follow Nurse / BPA Driven Protocol    sodium chloride    sodium chloride    sodium chloride    Laboratory results:  Lab Results   Component Value Date    GLUCOSE 104 (H) 07/29/2023    CALCIUM 9.0 07/29/2023     07/29/2023    K 4.0 07/29/2023    CO2 26.9 07/29/2023     07/29/2023    BUN 15 07/29/2023    CREATININE 0.87 07/29/2023    EGFRIFAFRI 97 11/08/2016    EGFRIFNONA 90 09/11/2019    BCR 17.2 07/29/2023    ANIONGAP 12.1 07/29/2023     Lab Results   Component Value Date    WBC 8.18 07/29/2023    HGB 13.7 07/29/2023    HCT 40.1 07/29/2023    MCV 87.4 07/29/2023     07/29/2023          No results found for: INR, PROTIME  Lab Results   Component Value Date    TSH 1.650 03/03/2022     No components found for: LDLCALC  Lab Results   Component Value Date    HGBA1C 5.40 03/03/2022     No components found for: B12    Review and interpretation of imaging:   XR Chest 2 View    Result Date: 7/26/2023  PA AND LATERAL CHEST RADIOGRAPH  HISTORY: Tremors. Difficulty with speech.  COMPARISON: None available.  FINDINGS: Heart size is within normal limits. No pneumothorax, pleural effusion, or acute infiltrate is seen. There is some linear scarring versus atelectasis noted at the left lung base.      Linear scarring versus atelectasis noted at the left lung base.  This report was  finalized on 7/26/2023 9:10 PM by Dr. Betsy Grider M.D.      CT Head Without Contrast    Result Date: 7/26/2023  CT HEAD WITHOUT CONTRAST  HISTORY: Confusion  COMPARISON: None available.  TECHNIQUE: Axial CT imaging was obtained through the brain. No IV contrast was administered.  FINDINGS: No acute intracranial hemorrhage is seen. Ventricles are normal in size. There is no midline shift or mass effect. There is some periventricular and deep white matter microangiopathic change. Mucous retention cyst is noted within the left maxillary sinus. Left mastoid air cells are relatively underpneumatized, which may reflect changes of chronic mastoiditis/otitis media.      No acute intracranial findings.  Radiation dose reduction techniques were utilized, including automated exposure control and exposure modulation based on body size.  This report was finalized on 7/26/2023 9:08 PM by Dr. Betsy Grider M.D.      MRI Brain With & Without Contrast    Result Date: 7/28/2023  MRI OF THE BRAIN WITH AND WITHOUT CONTRAST ON 07/28/2023  CLINICAL HISTORY: Patient has high fevers, has some tremors and aphasia, suspect encephalitis.  TECHNIQUE: Axial T1, FLAIR, fat-suppressed T2, axial diffusion and gradient echo T2, sagittal T1 and postcontrast axial fat-suppressed T1 and coronal T1-weighted images were obtained of the entire head.  This is correlated to head CT 2 days ago on 07/26/2023. There are no prior MRIs of the brain for comparison.  FINDINGS: There are scattered tiny patchy nodular foci of T2 high signal in the frontoparietal white matter most consistent with mild small vessel disease. The remainder of the brain parenchyma is normal in signal intensity. Specifically no diffusion weighted abnormality is identified with no acute infarct identified. The ventricles are normal in size. I see no focal mass effect. There is no midline shift. No extra-axial fluid collections are identified. No abnormal areas of enhancement are  seen in the head. The paranasal sinuses and the mastoid air cells and middle ear cavities are clear. Good flow voids are demonstrated within the cerebral vessels and in the dural venous sinuses. The calvarium and skull base demonstrate normal marrow signal intensity. The orbits are unremarkable.      1. No acute intracranial abnormality is identified. 2. There are scattered tiny patchy nodular foci of T2 high signal in the frontoparietal white matter most consistent with mild small vessel disease. The remainder of the MRI of the brain is within normal limits. Specifically I see no evidence of encephalitis on this exam. The patient did have a lumbar puncture performed earlier this morning, correlation with that result is suggested.      IR LUMBAR PUNCTURE DIAGNOSTIC    Result Date: 7/28/2023  FLUOROSCOPIC GUIDED LUMBAR PUNCTURE  HISTORY: fever, headache; R50.9-Fever, unspecified; R47.89-Other speech disturbances   TECHNIQUE: Risks, benefits and alternatives were discussed with the patient. Informed consent obtained. A pre-procedure timeout was performed confirming correct patient and procedure.  The patient was placed on the fluoroscopy table, and the skin overlying the lumbar spine was prepped and draped in the usual sterile fashion with 2% chlorhexidine. 1% lidocaine was utilized for local anesthesia.  Next, a 20-gauge spinal needle was then advanced into the CSF space under fluoroscopic guidance at the L3 level. Approximately 10 mL of clear CSF was then obtained and submitted to the laboratory for evaluation.  The patient tolerated the procedure well without immediate complication.       Technically successful fluoroscopically guided lumbar puncture.  Reference air kerma: 13.3 mGy  This report was finalized on 7/28/2023 9:34 AM by Dr. Nikolas Rosado M.D.       Work-up to date:  CT Head wo 7/26: No acute intracranial findings  MRI brain w/wo 7/28: No acute intracranial abnormality identified.  Mild small vessel  disease.  No evidence of encephalitis on exam.  LP 7/28: Approx. 10mL CSF obtained    Lab Review: SALEEM panel negative, meningitis/encephalitis panel (-), HIV 1/2 non-reactive, Hepatitis panel non-reactive, blood cx no growth at 2 days, VZV (-), CSF cx no growth, antiscleroderma-70 <0.2, mycoplasma pneu IgG 1526, Lyme IgG/IgM positive        Impression: Mr. Stubbs is a 57-year-old male with a past medical history of hypertension, otherwise healthy.  He reports that he went hiking at the beginning of July and approximately 2 weeks later developed persistent high fevers with a generalized headache and associated neck pain.  The symptoms began to improve and then he developed a persistent tremor and mild word finding difficulty as well as associated rash.  There was concern for a possible arbovirus and he underwent LP with CSF studies as well as MRI brain for word finding difficulties.  MRI brain showed no encephalitis or acute infarct.  Infectious diseases following, his lumbar puncture is unremarkable and not consistent with encephalitis or meningitis. The patient has been on doxycycline and has responded well.  ID recommends completing a 10-day course of doxycycline. Throughout the course of his illness he developed an abrupt upper extremity action tremor, right greater than left.  He and his wife state that the tremor has improved.  We discussed non-pharmacologic treatments such as weighted hand glove if needed.  If tremor persists he can follow-up with neurology outpatient for further management.  Patient has also had some elevated blood pressures during admission.  We discussed monitoring blood pressure at home and keeping a log that can be provided to PCP for further management. No further neuro work-up planned.  We will sign off and see again upon request.  Call RRT for any acute neurologic change or concern.    Plan:  Follow-up West Nile and cytology CSF, serum histoplasma, urine histoplasma,  rickettsia,  ehrlichia  Continue 10-day course of doxycycline 100mg PO per ID  Monitor BP at home  May follow-up with neurology outpatient as needed.      I have discussed the above with the patient and family and Dr. Hodges who are in agreement with the plan.     Elyse Carvajal, VALERIE  07/29/23  08:51 EDT    Diagnoses:    Essential hypertension    Depression    Mixed hyperlipidemia    Lyme disease    Rash    Elevated LFTs

## 2023-07-29 NOTE — PLAN OF CARE
Goal Outcome Evaluation:   VSS. A&Ox4.Ambulated around unit during the evening. IV ABX given as ordered. Possible phlebtis from old IV site that was removed. Elevated arm and placed warm compress.PRN Tylenol given x1.  Care continuing.

## 2023-07-31 ENCOUNTER — TRANSITIONAL CARE MANAGEMENT TELEPHONE ENCOUNTER (OUTPATIENT)
Dept: CALL CENTER | Facility: HOSPITAL | Age: 58
End: 2023-07-31
Payer: COMMERCIAL

## 2023-07-31 ENCOUNTER — TELEPHONE (OUTPATIENT)
Dept: INTERNAL MEDICINE | Facility: CLINIC | Age: 58
End: 2023-07-31
Payer: COMMERCIAL

## 2023-07-31 LAB
BACTERIA SPEC AEROBE CULT: NORMAL
CYTO UR: NORMAL
GRAM STN SPEC: NORMAL
GRAM STN SPEC: NORMAL
LAB AP CASE REPORT: NORMAL
PATH REPORT.FINAL DX SPEC: NORMAL
PATH REPORT.GROSS SPEC: NORMAL
RICKETTSIA RICKETTSII DNA, RT: NOT DETECTED

## 2023-07-31 NOTE — PROGRESS NOTES
Case Management Discharge Note      Final Note: Discharged to home on 7/29/23. PHOENIX Suero RN, CCP.         Selected Continued Care - Discharged on 7/29/2023 Admission date: 7/26/2023 - Discharge disposition: Home or Self Care      Destination    No services have been selected for the patient.                Durable Medical Equipment    No services have been selected for the patient.                Dialysis/Infusion    No services have been selected for the patient.                Home Medical Care    No services have been selected for the patient.                Therapy    No services have been selected for the patient.                Community Resources    No services have been selected for the patient.                Community & DME    No services have been selected for the patient.                    Transportation Services  Private: Car    Final Discharge Disposition Code: 01 - home or self-care

## 2023-07-31 NOTE — PAYOR COMM NOTE
"Sanjeev Armstrong (57 y.o. Male)          DC SUMMARY FOR [id number]     CONTACT LADONNA ZHANG  P# [phone]  F# [phone]         Date of Birth   1965    Social Security Number       Address   [address] [place] [id number]    Home Phone       MRN   [id number]       Gnosticist   Evangelical    Marital Status                               Admission Date   7/26/23    Admission Type   Urgent    Admitting Provider   Ed Lee MD    Attending Provider       Department, Room/Bed   [facility] [place], P476/1       Discharge Date   7/29/2023    Discharge Disposition   Home or Self Care    Discharge Destination                                 Attending Provider: (none)   Allergies: No Known Allergies    Isolation: None   Infection: None   Code Status: Prior    Ht: 182.9 cm (72\")   Wt: 101 kg (223 lb 5.2 oz)    Admission Cmt: None   Principal Problem: Lyme disease [A69.20]                   Active Insurance as of 7/26/2023       Primary Coverage       Payor Plan Insurance Group Employer/Plan Group    [employer] [facility] [employer] [id number]       Payor Plan Address Payor Plan Phone Number Payor Plan Fax Number Effective Dates    [address] [phone]  1/1/2022 - None Entered    [place] [address]         Subscriber Name Subscriber Birth Date Member ID       SANJEEV ARMSTRONG 1965 [id number]                     Emergency Contacts        (Rel.) Home Phone Work Phone Mobile Phone    Kayleen Armstrong (Spouse) [phone] -- --                 Physician Progress Notes (last 72 hours)        Yemi Hui DO at 07/29/23 1019           LOS: 2 days     Chief Complaint: Fever    Interval History: Fever curve improved and overall patient reports he is improving symptomatically.  Rashes improved and disappearing.  Tolerating doxycycline well.    Vital Signs  Temp:  [97.3 °F (36.3 °C)-98.2 °F (36.8 °C)] 97.8 °F (36.6 °C)  Heart Rate:  [68-92] " 81  Resp:  [16-18] 16  BP: (130-159)/(73-94) 130/73    Physical Exam:  General: In no acute distress  HEENT: Oropharynx clear, moist mucous membranes  Respiratory: Normal work of breathing  Skin: Fading blotchy erythematous rash over trunk and extremities.  Extremities: No edema, cyanosis  Access: Peripheral IV    Antibiotics:  Anti-Infectives (From admission, onward)      Ordered     Dose/Rate Route Frequency Start Stop    07/29/23 0949  doxycycline (MONODOX) capsule 100 mg        Ordering Provider: Yemi Hui,     100 mg Oral Every 12 Hours Scheduled 07/29/23 1045 08/06/23 0859    07/26/23 2230  doxycycline (VIBRAMYCIN) 100 mg in sodium chloride 0.9 % 100 mL IVPB-VTB        Ordering Provider: Shen Maciel MD    100 mg  100 mL/hr over 60 Minutes Intravenous Once 07/26/23 2300 07/26/23 2359             Results Review:     I reviewed the patient's new clinical results.    Lab Results   Component Value Date    WBC 8.18 07/29/2023    HGB 13.7 07/29/2023    HCT 40.1 07/29/2023    MCV 87.4 07/29/2023     07/29/2023     Lab Results   Component Value Date    GLUCOSE 104 (H) 07/29/2023    BUN 15 07/29/2023    CREATININE 0.87 07/29/2023    EGFRIFNONA 90 09/11/2019    EGFRIFAFRI 97 11/08/2016    BCR 17.2 07/29/2023    CO2 26.9 07/29/2023    CALCIUM 9.0 07/29/2023    PROTENTOTREF 6.3 03/10/2023    ALBUMIN 3.4 (L) 07/29/2023    LABIL2 2.2 03/10/2023    AST 25 07/29/2023    ALT 51 (H) 07/29/2023     HIV Ab negative  Acute Hep A/B/C negative  Lyme Ab positive  Ehrlichia PCR pending  Rickettsia PCR pending  WNV IgM and IgG pending  UA negative LE and nitrite  Serum and Urine Histo Ag pending  Mycoplasma Ab positive  SALEEM negative  ANCA negative  ACE level within normal limits  RPR negative    Lumbar puncture (7/28/23):  Component      Latest Ref Rng 7/28/2023   Color, CSF      Colorless  Colorless    Color, CSF      Colorless  Colorless    Appearance, CSF      Clear  Clear    Appearance, CSF      Clear   Clear    RBC, CSF      0 - 0 /mm3 47 (H)    RBC, CSF      0 - 0 /mm3 16 (H)    Total Nucleated Cells      0 - 5 /mm3 1    Total Nucleated Cells      0 - 5 /mm3 2    Tube Number, CSF 1    Tube Number, CSF 4    Method: UF 1000i Automated Method    Method: UF 1000i Automated Method    Glucose, CSF      40 - 70 mg/dL 63    Protein, Total (CSF)      15.0 - 45.0 mg/dL 34.1       (H) High   meningitis encephalitis panel negative    Microbiology:   Strep A PCR: negative   COVD/Flu/RSV PCR: negative   BCx: NGTD   CSF Cx: No growth to date    Assessment    Fever in adult  Painful, non-pruritic rash  Elevated liver enzymes  Bug bites  Headache and previous neck stiffness  (All new to me today)     Patient improved on Doxy therapy with resolution of fevers.  So far laboratory work-up has been unremarkable other than positive antibodies for Lyme and mycoplasma.  These are difficult to interpret however doxycycline would be adequate therapy.  Lumbar puncture is unremarkable and not consistent with encephalitis or meningitis.      Recommend completing out 10-day course of doxycycline 100 mg twice daily.  Discussed with the patient that diagnosis may remain elusive however his clinical improvement is the main goal and empiric therapy has shown improvement.  Okay with discharge from an ID perspective once okay with other services.      Thank you for allowing me to be involved in the care of this patient. Infectious diseases will sign off at this time with antibiotics plan in place, but please call me at [phone] if any further ID questions or new ID concerns.          Electronically signed by Yemi Hui DO at 23 1026       Elyse Carvajal APRN at 23 0851       Attestation signed by Tien Hodges MD at 23 1212    I have reviewed this documentation and agree.                  DOS: 2023  NAME: Sanjeev Stubbs   : 1965  PCP: Harshil Garcia MD  Chief  "Complaint   Patient presents with    Speech Problem    Tremors     During triage multiple circular macular red areas over body,and extremities      Neurology    Subjective:     Interval History  Pt seen in follow up today, however the problem is new to the examiner.  Patient Complaints:  No acute events overnight.  Patient is dressed and sitting in recliner chair, states he feels pretty good.  Wants to discharge home.  History taken from: patient chart    Objective:  Vital signs: /73 (BP Location: Left arm)   Pulse 81   Temp 97.8 °F (36.6 °C) (Oral)   Resp 16   Ht 182.9 cm (72\")   Wt 101 kg (223 lb 5.2 oz)   SpO2 94%   BMI 30.29 kg/m²       Physical Exam:  GENERAL: NAD, alert and cooperative  HEENT: Normocephalic, atraumatic   Resp: Even and unlabored  Extremities: No signs of distal embolization. Normal joint ROM  Skin: Warm and dry. Macular fading pink/red areas scattered throughout torso and extremities.  Psychiatric: Good mood, normal affect. Thought process normal.    Neurological:   MS: AOx3, recent/remote memory intact, normal attention/concentration, language intact, no neglect   CN: visual acuity grossly normal, PERRL, EOMI, no nystagmus, no facial droop, no dysarthria, hearing symmetric, tongue midline  Motor: 5/5 strength in all 4 ext. Normal tone. Subtle action tremor noted R>L  Sensory: Intact to light touch in all four ext.  Gait and station: Normal gait and station  Reflexes: 2+ throughout all 4 ext.     Results Review: I have reviewed MRI brain and see no acute stroke   I reviewed the patient's new clinical results.  I reviewed the patient's new imaging results and agree with the interpretation.  I reviewed the patient's other test results and agree with the interpretation  Discussed with Dr. Multani.    Current Medications:  Scheduled Medications:buPROPion XL, 150 mg, Oral, Daily  doxycycline, 100 mg, Intravenous, Q12H  lisinopril, 20 mg, Oral, Daily  pantoprazole, 40 mg, Oral, Q " AM  senna-docusate sodium, 2 tablet, Oral, BID  sertraline, 100 mg, Oral, Daily  sodium chloride, 10 mL, Intravenous, Q12H      Infusions: hold, 1 each      PRN Medications:    acetaminophen **OR** acetaminophen **OR** acetaminophen    senna-docusate sodium **AND** polyethylene glycol **AND** bisacodyl **AND** bisacodyl    Calcium Replacement - Follow Nurse / BPA Driven Protocol    hold    Magnesium Standard Dose Replacement - Follow Nurse / BPA Driven Protocol    Phosphorus Replacement - Follow Nurse / BPA Driven Protocol    Potassium Replacement - Follow Nurse / BPA Driven Protocol    sodium chloride    sodium chloride    sodium chloride    Laboratory results:  Lab Results   Component Value Date    GLUCOSE 104 (H) 07/29/2023    CALCIUM 9.0 07/29/2023     07/29/2023    K 4.0 07/29/2023    CO2 26.9 07/29/2023     07/29/2023    BUN 15 07/29/2023    CREATININE 0.87 07/29/2023    EGFRIFAFRI 97 11/08/2016    EGFRIFNONA 90 09/11/2019    BCR 17.2 07/29/2023    ANIONGAP 12.1 07/29/2023     Lab Results   Component Value Date    WBC 8.18 07/29/2023    HGB 13.7 07/29/2023    HCT 40.1 07/29/2023    MCV 87.4 07/29/2023     07/29/2023          No results found for: INR, PROTIME  Lab Results   Component Value Date    TSH 1.650 03/03/2022     No components found for: LDLCALC  Lab Results   Component Value Date    HGBA1C 5.40 03/03/2022     No components found for: B12    Review and interpretation of imaging:   XR Chest 2 View    Result Date: 7/26/2023  PA AND LATERAL CHEST RADIOGRAPH  HISTORY: Tremors. Difficulty with speech.  COMPARISON: None available.  FINDINGS: Heart size is within normal limits. No pneumothorax, pleural effusion, or acute infiltrate is seen. There is some linear scarring versus atelectasis noted at the left lung base.      Linear scarring versus atelectasis noted at the left lung base.  This report was finalized on 7/26/2023 9:10 PM by Dr. Betsy Grider M.D.      CT Head Without  Contrast    Result Date: 7/26/2023  CT HEAD WITHOUT CONTRAST  HISTORY: Confusion  COMPARISON: None available.  TECHNIQUE: Axial CT imaging was obtained through the brain. No IV contrast was administered.  FINDINGS: No acute intracranial hemorrhage is seen. Ventricles are normal in size. There is no midline shift or mass effect. There is some periventricular and deep white matter microangiopathic change. Mucous retention cyst is noted within the left maxillary sinus. Left mastoid air cells are relatively underpneumatized, which may reflect changes of chronic mastoiditis/otitis media.      No acute intracranial findings.  Radiation dose reduction techniques were utilized, including automated exposure control and exposure modulation based on body size.  This report was finalized on 7/26/2023 9:08 PM by Dr. Betsy Grider M.D.      MRI Brain With & Without Contrast    Result Date: 7/28/2023  MRI OF THE BRAIN WITH AND WITHOUT CONTRAST ON 07/28/2023  CLINICAL HISTORY: Patient has high fevers, has some tremors and aphasia, suspect encephalitis.  TECHNIQUE: Axial T1, FLAIR, fat-suppressed T2, axial diffusion and gradient echo T2, sagittal T1 and postcontrast axial fat-suppressed T1 and coronal T1-weighted images were obtained of the entire head.  This is correlated to head CT 2 days ago on 07/26/2023. There are no prior MRIs of the brain for comparison.  FINDINGS: There are scattered tiny patchy nodular foci of T2 high signal in the frontoparietal white matter most consistent with mild small vessel disease. The remainder of the brain parenchyma is normal in signal intensity. Specifically no diffusion weighted abnormality is identified with no acute infarct identified. The ventricles are normal in size. I see no focal mass effect. There is no midline shift. No extra-axial fluid collections are identified. No abnormal areas of enhancement are seen in the head. The paranasal sinuses and the mastoid air cells and middle ear  cavities are clear. Good flow voids are demonstrated within the cerebral vessels and in the dural venous sinuses. The calvarium and skull base demonstrate normal marrow signal intensity. The orbits are unremarkable.      1. No acute intracranial abnormality is identified. 2. There are scattered tiny patchy nodular foci of T2 high signal in the frontoparietal white matter most consistent with mild small vessel disease. The remainder of the MRI of the brain is within normal limits. Specifically I see no evidence of encephalitis on this exam. The patient did have a lumbar puncture performed earlier this morning, correlation with that result is suggested.      IR LUMBAR PUNCTURE DIAGNOSTIC    Result Date: 7/28/2023  FLUOROSCOPIC GUIDED LUMBAR PUNCTURE  HISTORY: fever, headache; R50.9-Fever, unspecified; R47.89-Other speech disturbances   TECHNIQUE: Risks, benefits and alternatives were discussed with the patient. Informed consent obtained. A pre-procedure timeout was performed confirming correct patient and procedure.  The patient was placed on the fluoroscopy table, and the skin overlying the lumbar spine was prepped and draped in the usual sterile fashion with 2% chlorhexidine. 1% lidocaine was utilized for local anesthesia.  Next, a 20-gauge spinal needle was then advanced into the CSF space under fluoroscopic guidance at the L3 level. Approximately 10 mL of clear CSF was then obtained and submitted to the laboratory for evaluation.  The patient tolerated the procedure well without immediate complication.       Technically successful fluoroscopically guided lumbar puncture.  Reference air kerma: 13.3 mGy  This report was finalized on 7/28/2023 9:34 AM by Dr. Nikolas Rosado M.D.       Work-up to date:  CT Head wo 7/26: No acute intracranial findings  MRI brain w/wo 7/28: No acute intracranial abnormality identified.  Mild small vessel disease.  No evidence of encephalitis on exam.  LP 7/28: Approx. 10mL CSF  obtained    Lab Review: SALEEM panel negative, meningitis/encephalitis panel (-), HIV 1/2 non-reactive, Hepatitis panel non-reactive, blood cx no growth at 2 days, VZV (-), CSF cx no growth, antiscleroderma-70 <0.2, mycoplasma pneu IgG 1526, Lyme IgG/IgM positive        Impression: Mr. Stubbs is a 57-year-old male with a past medical history of hypertension, otherwise healthy.  He reports that he went hiking at the beginning of July and approximately 2 weeks later developed persistent high fevers with a generalized headache and associated neck pain.  The symptoms began to improve and then he developed a persistent tremor and mild word finding difficulty as well as associated rash.  There was concern for a possible arbovirus and he underwent LP with CSF studies as well as MRI brain for word finding difficulties.  MRI brain showed no encephalitis or acute infarct.  Infectious diseases following, his lumbar puncture is unremarkable and not consistent with encephalitis or meningitis. The patient has been on doxycycline and has responded well.  ID recommends completing a 10-day course of doxycycline. Throughout the course of his illness he developed an abrupt upper extremity action tremor, right greater than left.  He and his wife state that the tremor has improved.  We discussed non-pharmacologic treatments such as weighted hand glove if needed.  If tremor persists he can follow-up with neurology outpatient for further management.  Patient has also had some elevated blood pressures during admission.  We discussed monitoring blood pressure at home and keeping a log that can be provided to PCP for further management. No further neuro work-up planned.  We will sign off and see again upon request.  Call RRT for any acute neurologic change or concern.    Plan:  Follow-up West Nile and cytology CSF, serum histoplasma, urine histoplasma,  rickettsia, ehrlichia  Continue 10-day course of doxycycline 100mg PO per ID  Monitor BP at  home  May follow-up with neurology outpatient as needed.      I have discussed the above with the patient and family and Dr. Hodges who are in agreement with the plan.     Elyse Carvajal, VALERIE  23  08:51 EDT    Diagnoses:    Essential hypertension    Depression    Mixed hyperlipidemia    Lyme disease    Rash    Elevated LFTs      Electronically signed by Tien Hodges MD at 23 1212       Stas Leonard MD at 23 [phone]              Name: Sanjeev Stubbs ADMIT: 2023   : 1965  PCP: Harshil Garcia MD    MRN: [id number] LOS: 1 days   AGE/SEX: 57 y.o. male  ROOM: [facility]     Subjective   Subjective   Chief Complaint   Patient presents with    Speech Problem    Tremors     During triage multiple circular macular red areas over body,and extremities      I saw after he had returned from the lumbar puncture.  He was not reporting any neck pain or headache.  He was still laying supine.  No chest pain palpitation shortness of breath nausea vomiting diarrhea abdominal pain or dysuria reported.    Objective   Objective   Vital Signs  Temp:  [97.3 °F (36.3 °C)-100.7 °F (38.2 °C)] 97.5 °F (36.4 °C)  Heart Rate:  [68-89] 74  Resp:  [16-18] 18  BP: (137-159)/(79-96) 140/81  SpO2:  [95 %-97 %] 97 %  on   ;   Device (Oxygen Therapy): room air  Body mass index is 30.29 kg/m².    Physical Exam  Vitals and nursing note reviewed.   Constitutional:       General: He is not in acute distress.     Appearance: He is not diaphoretic.   HENT:      Head: Atraumatic.   Eyes:      General: No scleral icterus.     Conjunctiva/sclera: Conjunctivae normal.   Cardiovascular:      Rate and Rhythm: Normal rate and regular rhythm.      Pulses: Normal pulses.   Pulmonary:      Effort: Pulmonary effort is normal.      Breath sounds: No wheezing.   Abdominal:      General: There is no distension.      Palpations: Abdomen is soft.      Tenderness: There is no abdominal tenderness. There is no guarding or  rebound.   Musculoskeletal:         General: No swelling or tenderness.   Skin:     General: Skin is warm and dry.   Neurological:      Mental Status: He is alert.      Comments: PERRL, no facial droop, tongue midline, palate elevated equally, did not test all cranial nerves due to his mobility limitations post-LP   Psychiatric:         Mood and Affect: Mood normal.         Behavior: Behavior normal.     Results Review  I reviewed the patient's new clinical results.    Results from last 7 days   Lab Units 07/28/23  0605 07/27/23 0610 07/26/23 2016   WBC 10*3/mm3 7.32 11.95* 9.78   HEMOGLOBIN g/dL 12.6* 12.8* 12.8*   PLATELETS 10*3/mm3 333 330 362     Results from last 7 days   Lab Units 07/28/23  0605 07/27/23  1042 07/27/23 0610 07/26/23 2016   SODIUM mmol/L 141  --  141 138   POTASSIUM mmol/L 3.7 3.9 3.8 3.2*   CHLORIDE mmol/L 105  --  105 100   CO2 mmol/L 29.0  --  27.9 30.4*   BUN mg/dL 11  --  11 18   CREATININE mg/dL 0.78  --  0.82 0.81   GLUCOSE mg/dL 106*  --  106* 126*   EGFR mL/min/1.73 105.3  --  103.7 104.1     Results from last 7 days   Lab Units 07/28/23  0605 07/27/23  0610 07/26/23  2016   ALBUMIN g/dL 3.5 3.3* 3.8   BILIRUBIN mg/dL 0.6 0.7 0.6   ALK PHOS U/L 160* 160* 197*   AST (SGOT) U/L 35 45* 52*   ALT (SGPT) U/L 56* 65* 75*     Results from last 7 days   Lab Units 07/28/23  0605 07/27/23  0610 07/26/23  2016   CALCIUM mg/dL 8.6 8.5* 9.1   ALBUMIN g/dL 3.5 3.3* 3.8     Results from last 7 days   Lab Units 07/26/23 2017   LACTATE mmol/L 0.8     No results found for: HGBA1C, POCGLU    XR Chest 2 View    Result Date: 7/26/2023  Linear scarring versus atelectasis noted at the left lung base.  This report was finalized on 7/26/2023 9:10 PM by Dr. Betsy Grider M.D.      CT Head Without Contrast    Result Date: 7/26/2023  No acute intracranial findings.  Radiation dose reduction techniques were utilized, including automated exposure control and exposure modulation based on body size.  This  report was finalized on 7/26/2023 9:08 PM by Dr. Betsy Grider M.D.      MRI Brain With & Without Contrast    Result Date: 7/28/2023  1. No acute intracranial abnormality is identified. 2. There are scattered tiny patchy nodular foci of T2 high signal in the frontoparietal white matter most consistent with mild small vessel disease. The remainder of the MRI of the brain is within normal limits. Specifically I see no evidence of encephalitis on this exam. The patient did have a lumbar puncture performed earlier this morning, correlation with that result is suggested.      IR LUMBAR PUNCTURE DIAGNOSTIC    Result Date: 7/28/2023  Technically successful fluoroscopically guided lumbar puncture.  Reference air kerma: 13.3 mGy  This report was finalized on 7/28/2023 9:34 AM by Dr. Nikolas Rosado M.D.       I have personally reviewed all medications:  Scheduled Medications  buPROPion XL, 150 mg, Oral, Daily  doxycycline, 100 mg, Intravenous, Q12H  lisinopril, 20 mg, Oral, Daily  pantoprazole, 40 mg, Oral, Q AM  senna-docusate sodium, 2 tablet, Oral, BID  sertraline, 100 mg, Oral, Daily  sodium chloride, 10 mL, Intravenous, Q12H      Infusions  hold, 1 each      Diet  Diet: Cardiac Diets; Healthy Heart (2-3 Na+); Texture: Regular Texture (IDDSI 7); Fluid Consistency: Thin (IDDSI 0)    I have personally reviewed:  [x]  Laboratory   [x]  Microbiology   [x]  Radiology   [x]  EKG/Telemetry  []  Cardiology/Vascular   []  Pathology    []  Records      Assessment/Plan     Active Hospital Problems    Diagnosis  POA    **FUO (fever of unknown origin) [R50.9]  Yes    Rash [R21]  Yes    Elevated LFTs [R79.89]  Yes    Mixed hyperlipidemia [E78.2]  Yes    Essential hypertension [I10]  Yes    Depression [F32.A]  Yes      Resolved Hospital Problems   No resolved problems to display.       57 y.o. male admitted with FUO (fever of unknown origin).    Lyme disease: Blood test have returned positive with Lyme IgM and IgG.  He had bug  bites while hiking recently but did not have any found ticks.  He has had improvement in LFTs on the doxycycline.  He did still have a fever yesterday afternoon.  Continue to monitor LP results and fever curve.  Infectious disease following.  Metabolic encephalopathy: Possibly secondary to his infectious issues and fever.  LP and MRI has been performed to check for potential encephalitis/meningitis.  Initial studies with low nucleated cells would appear to be aseptic.  Awaiting culture.  Neurology following.  Elevated LFT: Monitor with treatment of above  Hypertension: Acceptable acutely.  HLD: Statin held  Ppx: SCD  Disposition: Home/few days    Expected Discharge Date: 7/31/2023; Expected Discharge Time:      Stas Leonard MD  [facility]  07/28/23  14:51 EDT    Dictated portions of note using dragon dictation software.  Copied text in this note has been reviewed by me and remains accurate as of 07/28/23    Electronically signed by Stas Leonard MD at 07/28/23 1509       Galdino Shafer MD at 07/28/23 1020           Chart Correction Flag was removed by Krista Pineda on 07/28/23 1047               ID NOTE    CC: f/u fever    Subj: No fever. No acute events. He has what sounds like an intentional tremor and still some word finding difficulties. Getting an LP and MRI brain done this morning. D/w his wife.     Medications:    Current Facility-Administered Medications:     acetaminophen (TYLENOL) tablet 650 mg, 650 mg, Oral, Q4H PRN, 650 mg at 07/28/23 0555 **OR** acetaminophen (TYLENOL) 160 MG/5ML solution 650 mg, 650 mg, Oral, Q4H PRN **OR** acetaminophen (TYLENOL) suppository 650 mg, 650 mg, Rectal, Q4H PRN, Chely Gardner APRN    sennosides-docusate (PERICOLACE) 8.6-50 MG per tablet 2 tablet, 2 tablet, Oral, BID **AND** polyethylene glycol (MIRALAX) packet 17 g, 17 g, Oral, Daily PRN **AND** bisacodyl (DULCOLAX) EC tablet 5 mg, 5 mg, Oral, Daily PRN **AND**  bisacodyl (DULCOLAX) suppository 10 mg, 10 mg, Rectal, Daily PRN, Chely Gardner APRN    buPROPion XL (WELLBUTRIN XL) 24 hr tablet 150 mg, 150 mg, Oral, Daily, Chely Gardner APRN, 150 mg at 07/27/23 0901    Calcium Replacement - Follow Nurse / BPA Driven Protocol, , Does not apply, PRN, Chely Gardner APRN    doxycycline (VIBRAMYCIN) 100 mg in sodium chloride 0.9 % 100 mL IVPB-VTB, 100 mg, Intravenous, Q12H, Paula Nava APRN, 100 mg at 07/27/23 2304    gadobenate dimeglumine (MULTIHANCE) injection 20 mL, 20 mL, Intravenous, Once in imaging, Stas Leonard MD    Hold medication, 1 each, Does not apply, Continuous PRN, Galdino Shafer MD    lisinopril (PRINIVIL,ZESTRIL) tablet 20 mg, 20 mg, Oral, Daily, Chely Gardner APRN, 20 mg at 07/27/23 0901    Magnesium Standard Dose Replacement - Follow Nurse / BPA Driven Protocol, , Does not apply, PRN, Chely Gardner APRN    pantoprazole (PROTONIX) EC tablet 40 mg, 40 mg, Oral, Q AM, Chely Gardner APRN, 40 mg at 07/28/23 0555    Phosphorus Replacement - Follow Nurse / BPA Driven Protocol, , Does not apply, PRN, Chely Gardner APRN    Potassium Replacement - Follow Nurse / BPA Driven Protocol, , Does not apply, PRN, Chely Gardner APRN    sertraline (ZOLOFT) tablet 100 mg, 100 mg, Oral, Daily, Chely Gardner APRN, 100 mg at 07/27/23 0901    sodium chloride 0.9 % flush 10 mL, 10 mL, Intravenous, PRN, Shen Maciel MD    sodium chloride 0.9 % flush 10 mL, 10 mL, Intravenous, Q12H, Chely Gardner APRN, 10 mL at 07/27/23 2012    sodium chloride 0.9 % flush 10 mL, 10 mL, Intravenous, PRN, Chely Gardner APRN    sodium chloride 0.9 % infusion 40 mL, 40 mL, Intravenous, PRN, Chely Gardner APRN      Objective   Vital Signs   Temp:  [97.3 °F (36.3 °C)-100.7 °F (38.2 °C)] 97.3 °F (36.3 °C)  Heart Rate:  [69-89] 69  Resp:  [16] 16  BP:  (138-159)/(79-96) 138/83    Labs:   CBC, CMP, HIV Ab, RPR, and blood cultures reviewed today  Lab Results   Component Value Date    WBC 7.32 07/28/2023    HGB 12.6 (L) 07/28/2023    HCT 37.1 (L) 07/28/2023    MCV 88.3 07/28/2023     07/28/2023       Lab Results   Component Value Date    GLUCOSE 106 (H) 07/28/2023    BUN 11 07/28/2023    CREATININE 0.78 07/28/2023    EGFRIFNONA 90 09/11/2019    EGFRIFAFRI 97 11/08/2016    BCR 14.1 07/28/2023    CO2 29.0 07/28/2023    CALCIUM 8.6 07/28/2023    PROTENTOTREF 6.3 03/10/2023    ALBUMIN 3.5 07/28/2023    LABIL2 2.2 03/10/2023    AST 35 07/28/2023    ALT 56 (H) 07/28/2023     Lab Results   Component Value Date    RPR Non-Reactive 07/27/2023     Lab Results   Component Value Date    CRP 5.30 (H) 07/27/2023     Lab Results   Component Value Date    FERRITIN 430.00 (H) 07/27/2023       HIV Ab negative  Acute Hep A/B/C negative  Lyme Ab pending  Ehrlichia PCR pending  Rickettsia PCR pending  WNV IgM and IgG ordered  UA negative LE and nitrite  Serum and Urine Histo Ag pending  Mycoplasma Ab pending  SALEEM pending  ANCA pending  ACE level pending    Lumbar puncture (7/28/23):  Cell count pending  Diff pending  Glc pending  Pro pending  WNV IgM pending  CSF PCR pending    Microbiology:  7/26 Strep A PCR: negative  7/26 COVD/Flu/RSV PCR: negative  7/26 BCx: NGTD  7/28 CSF Cx: pending    Radiology:  MRI brain pending    ASSESSMENT/PLAN:  Fever in adult  Painful, non-pruritic rash  Elevated liver enzymes  Bug bites  Headache and previous neck stiffness    His fever is improved. There is not much change to his neurologic symptoms. The cause of his presentation has not yet been determined. As mentioned yesterday, infectious and autoimmune conditions lead the differential diagnosis. D/w Dr Hodges today who ordered an MRI. I appreciate his help.     I will continue empiric doxycycline 100 mg IV q12h with duration TBD. I think we could probably change to the oral formulation in  "1-2 days.     ID will follow.       Electronically signed by Galdino Shafer MD at 07/28/23 1026          Consult Notes (last 72 hours)        Tien Hodges MD at 07/28/23 0928        Consult Orders    1. Inpatient Neurology Consult General [[id number]] ordered by Mateusz Mulligan MD at 07/27/23 1815                 Neurology Consult Note    Consult Date: 7/28/2023    Referring MD: Harshil Garcia MD    Reason for Consult I have been asked to see the patient in neurological consultation to render advice and opinion regarding headache, word finding difficulty    Sanjeev Stubbs is a 57 y.o. male with hypertension, depression, otherwise healthy.  He reports that he took a hiking trip to [place] and 2 weeks later developed persistent high fevers with a generalized headache and associated neck pain.  That ultimately began to improve and then he developed persistent tremor and mild word finding difficulty.  He was admitted to the hospital for these fevers as well as an associated rash.  There is concern for a possible arbovirus and CSF studies have been ordered.    Past Medical History:   Diagnosis Date    Depression     Hypertension        Exam  /83 (BP Location: Left arm, Patient Position: Lying)   Pulse 69   Temp 97.3 °F (36.3 °C) (Oral)   Resp 16   Ht 182.9 cm (72\")   Wt 101 kg (223 lb 5.2 oz)   SpO2 95%   BMI 30.29 kg/m²   Gen: NAD, vitals reviewed  MS: oriented x3, recent/remote memory intact, normal attention/concentration, mild word finding difficulty, no neglect.  CN: visual acuity grossly normal, PERRL, EOMI, no facial droop, no dysarthria  Motor: 5/5 throughout upper and lower extremities, normal tone, fairly mild low amplitude high-frequency postural and kinetic tremor  Sensory: Intact to cold temperature and vibration throughout  Reflexes: 2+ throughout  DATA:    Lab Results   Component Value Date    GLUCOSE 106 (H) 07/28/2023    CALCIUM 8.6 07/28/2023    "  07/28/2023    K 3.7 07/28/2023    CO2 29.0 07/28/2023     07/28/2023    BUN 11 07/28/2023    CREATININE 0.78 07/28/2023    EGFRIFAFRI 97 11/08/2016    EGFRIFNONA 90 09/11/2019    BCR 14.1 07/28/2023    ANIONGAP 7.0 07/28/2023     Lab Results   Component Value Date    WBC 7.32 07/28/2023    HGB 12.6 (L) 07/28/2023    HCT 37.1 (L) 07/28/2023    MCV 88.3 07/28/2023     07/28/2023       Lab review: CBC, BMP unremarkable    Imaging review: MRI brain with and without contrast ordered    Diagnoses:  Aseptic meningitis  Postural tremor  Word-finding difficulty    Comment: Presumed aseptic meningitis/encephalitis, possibly arboviral.  Given his word finding difficulty I think an MRI of the brain is indicated to exclude any structural brain abnormality.    PLAN:  Agree with ID work-up and plan  Adding contrasted brain MRI    Discussed with Dr. Shafer this morning        Electronically signed by Tien Hodges MD at 07/28/23 0931                Discharge Summary        Stas Leonard MD at 07/29/23 1026              Date of Admission: 7/26/2023  Date of Discharge:  7/29/2023  Primary Care Physician: Harshil Garcia MD     Discharge Diagnosis:  Active Hospital Problems    Diagnosis  POA    **Lyme disease [A69.20]  Yes    Rash [R21]  Yes    Elevated LFTs [R79.89]  Yes    Mixed hyperlipidemia [E78.2]  Yes    Essential hypertension [I10]  Yes    Depression [F32.A]  Yes      Resolved Hospital Problems   No resolved problems to display.       Presenting Problem/History of Present Illness from H&P:  FUO (fever of unknown origin) [R50.9]  Word finding difficulty [R47.89]     Mr. Stubbs is a 57 y.o. non-smoker with a history of HTN, depression that presents to [facility] complaining of fevers, rash, tremors, speech difficulty. Traveled a few weeks ago that involved hiking. A few days after returning home, he started w/fevers and myalgias. He was seen at  on 7/15/23, thought to  have a viral illness w/recommendation to treat symptoms. Since then he has had nearly daily fevers and over the past 2-3 days has had tremors that make writing difficult and has had word finding difficulty, noticed by colleagues. He has had frequent headaches. He was having neck and jaw pain that has improved. Denies sore throat, cough, runny nose, soa, chest pain, n/v/d, dysuria, impaired gait/balance. He has also developed a red circular rash on all extremities and trunk; denies itching, pain.     Hospital Course:  The patient is a 57 y.o. male who presented with fever and confusion. He was admitted and underwent evaluation by ID and neurology services. Lyme IgM and IgG returned positive. He did have some rash (improving now) and elevated LFTs. The fever improved on doxycycline and he is going to transition to oral doxycycline to complete his antibiotic course. He did undergo lumbar puncture. CSF studies were not consistent with bacterial infection. PCR encephalitis panel from CSF was negative. [place] and cytology are still pending. Monitor those results at follow up. He reports he has primary care follow up planned for later this week.    Exam Today:  Constitutional:       General: He is not in acute distress.     Appearance: He is not diaphoretic.   HENT:      Head: Atraumatic.   Eyes:      General: No scleral icterus.     Conjunctiva/sclera: Conjunctivae normal.   Cardiovascular:      Rate and Rhythm: Normal rate and regular rhythm.      Pulses: Normal pulses.   Pulmonary:      Effort: Pulmonary effort is normal.      Breath sounds: No wheezing.   Abdominal:      General: There is no distension.      Palpations: Abdomen is soft.      Tenderness: There is no abdominal tenderness. There is no guarding or rebound.   Musculoskeletal:         General: No swelling or tenderness.   Skin:     General: Skin is warm and dry.   Neurological:      Mental Status: He is alert.      Comments: cn2-12 grossly intact    Psychiatric:         Mood and Affect: Mood normal.         Behavior: Behavior normal.     Results:  CXR  Linear scarring versus atelectasis noted at the left lung base.     CT Head  No acute intracranial findings.     MRI Brain  1. No acute intracranial abnormality is identified.  2. There are scattered tiny patchy nodular foci of T2 high signal in the  frontoparietal white matter most consistent with mild small vessel  disease. The remainder of the MRI of the brain is within normal limits.  Specifically I see no evidence of encephalitis on this exam. The patient  did have a lumbar puncture performed earlier this morning, correlation  with that result is suggested.    Procedures Performed:         Consults:   Consults       Date and Time Order Name Status Description    7/27/2023  6:15 PM Inpatient Neurology Consult General Completed     7/27/2023  8:29 AM Inpatient Infectious Diseases Consult Completed     7/26/2023  9:42 PM [facility] (on-call MD unless specified) Details               Discharge Disposition:  Home or Self Care    Discharge Medications:     Discharge Medications        New Medications        Instructions Start Date   doxycycline 100 MG capsule  Commonly known as: MONODOX   100 mg, Oral, 2 Times Daily             Continue These Medications        Instructions Start Date   acetaminophen 325 MG tablet  Commonly known as: TYLENOL   650 mg, Oral, Every 6 Hours PRN      atorvastatin 20 MG tablet  Commonly known as: LIPITOR   20 mg, Oral, Daily      buPROPion  MG 24 hr tablet  Commonly known as: WELLBUTRIN XL   150 mg, Oral, Every Morning      lisinopril 20 MG tablet  Commonly known as: PRINIVIL,ZESTRIL   20 mg, Oral, Daily      omeprazole 40 MG capsule  Commonly known as: priLOSEC   40 mg, Oral, 2 Times Daily      sertraline 100 MG tablet  Commonly known as: ZOLOFT   100 mg, Oral, Daily               Discharge Diet:   Diet Instructions       Advance Diet As Tolerated -Target Diet: home diet      Target  Diet: home diet            Activity at Discharge:   Activity Instructions       Activity as Tolerated              Follow-up Appointments:   Follow-up Information       Harshil Garcia MD Follow up.    Specialty: Family Medicine  Contact information:  [address] [id number] [phone]                             Test Results Pending at Discharge:  Pending Labs       Order Current Status    Ehrlichia Profile DNA PCR In process    Histoplasma Ag Ur - Urine, Urine, Clean Catch In process    Histoplasma Antigen ser In process    Non-gynecologic Cytology In process    [place] Draw In process    Rickettsia Species DNA, Real-Time PCR In process    [place] Antibodies, IgG & IgM In process    West Nile Virus, CSF In process    Blood Culture - Blood, Arm, Right Preliminary result    Blood Culture - Blood, Arm, Right Preliminary result    Culture, CSF - Cerebrospinal Fluid, Lumbar Puncture Preliminary result             Stas Leonard MD  07/29/23  10:26 EDT    Time Spent on Discharge Activities: >30 minutes    Dictated portions using Dragon dictation software.    Electronically signed by Stas Leonard MD at 07/29/23 3208

## 2023-08-01 LAB
A PHAGOCYTOPH DNA BLD QL NAA+PROBE: NEGATIVE
E CHAFFEENSIS DNA BLD QL NAA+PROBE: NEGATIVE
REF LAB TEST METHOD: NORMAL
WNV IGG SPEC QL IA: POSITIVE
WNV IGM CSF QL IA: NEGATIVE

## 2023-08-02 LAB — H CAPSUL AG SPEC QL: NORMAL

## 2023-08-03 ENCOUNTER — OFFICE VISIT (OUTPATIENT)
Dept: INTERNAL MEDICINE | Facility: CLINIC | Age: 58
End: 2023-08-03
Payer: COMMERCIAL

## 2023-08-03 VITALS
SYSTOLIC BLOOD PRESSURE: 140 MMHG | WEIGHT: 219.6 LBS | BODY MASS INDEX: 29.74 KG/M2 | DIASTOLIC BLOOD PRESSURE: 88 MMHG | OXYGEN SATURATION: 98 % | HEART RATE: 78 BPM | HEIGHT: 72 IN

## 2023-08-03 DIAGNOSIS — A69.20 LYME DISEASE: Primary | ICD-10-CM

## 2023-08-03 DIAGNOSIS — F33.41 RECURRENT MAJOR DEPRESSIVE DISORDER, IN PARTIAL REMISSION: ICD-10-CM

## 2023-08-03 DIAGNOSIS — I10 ESSENTIAL HYPERTENSION: ICD-10-CM

## 2023-08-03 DIAGNOSIS — E78.2 MIXED HYPERLIPIDEMIA: ICD-10-CM

## 2023-08-03 PROBLEM — R21 RASH: Status: RESOLVED | Noted: 2023-07-27 | Resolved: 2023-08-03

## 2023-08-03 PROBLEM — K21.9 GASTROESOPHAGEAL REFLUX DISEASE: Status: RESOLVED | Noted: 2023-06-01 | Resolved: 2023-08-03

## 2023-08-03 PROBLEM — R11.10 REGURGITATION OF FOOD: Status: RESOLVED | Noted: 2023-06-01 | Resolved: 2023-08-03

## 2023-08-03 PROCEDURE — 99214 OFFICE O/P EST MOD 30 MIN: CPT | Performed by: FAMILY MEDICINE

## 2023-08-16 ENCOUNTER — TELEPHONE (OUTPATIENT)
Dept: NEUROLOGY | Facility: CLINIC | Age: 58
End: 2023-08-16

## 2023-08-16 NOTE — TELEPHONE ENCOUNTER
"  Caller: Sanjeev Stubbs    Relationship to patient: Self    Best call back number: [phone]    New or established patient?  [x] New  [] Established    Date of discharge: 7/29/2023    Facility discharged from: [facility]    Diagnosis/Symptoms: LYME DISEASE \"Patient concerned about white matter changes on MRI\"    Length of stay (If applicable): 3 DAYS    Specialty Only: Did you see a [facility] health provider?    [x] Yes  [] No  If so, who? DR SIMON / BALBINA MEJIAS    REFERRAL ALSO IN PATIENT'S CHART FROM 8/3/23 STATES Patient concerned about white matter changes on MRI . PLEASE CONTACT PATIENT TO SCHEDULE OR ADVISE IF HUB OK TO SCHEDULE, THANK YOU.  "

## 2023-08-18 NOTE — TELEPHONE ENCOUNTER
Caller: Sanjeev Stubbs     Relationship to patient: Self     Best call back number: [phone]     PT CALLING BACK TO CHECK STATUS OF REFERRAL. PLEASE CALL PT ASAP TO GET HIM SCHEDULED.

## 2023-11-08 ENCOUNTER — OFFICE VISIT (OUTPATIENT)
Dept: NEUROLOGY | Facility: CLINIC | Age: 58
End: 2023-11-08
Payer: COMMERCIAL

## 2023-11-08 VITALS
WEIGHT: 222 LBS | BODY MASS INDEX: 30.07 KG/M2 | HEART RATE: 67 BPM | DIASTOLIC BLOOD PRESSURE: 84 MMHG | OXYGEN SATURATION: 99 % | HEIGHT: 72 IN | SYSTOLIC BLOOD PRESSURE: 148 MMHG

## 2023-11-08 DIAGNOSIS — Z86.19 HISTORY OF LYME DISEASE: Primary | ICD-10-CM

## 2023-11-08 NOTE — LETTER
November 8, 2023       No Recipients    Patient: Sanjeev Stubbs   YOB: 1965   Date of Visit: 11/8/2023     Dear Harshil Garcia MD:       Thank you for referring Sanjeev Stubbs to me for evaluation. Below are the relevant portions of my assessment and plan of care.    If you have questions, please do not hesitate to call me. I look forward to following Sanjeev along with you.         Sincerely,        Feng Antonio II, MD        CC:   No Recipients    Feng Antonio II, MD  11/08/23 0931  Sign when Signing Visit  Chief Complaint   Patient presents with   • Lyme Disease       Patient ID: Sanjeev Stubbs is a 58 y.o. male.    HPI: I had the pleasure of seeing your patient today.  As you may know he is a 58-year-old gentleman here for hospital follow-up status post discharge from [facility] in July.  He was seen by the neurology team at that time.  A thorough chart review was performed to gather history.  He apparently had Lyme's disease.  He was experiencing some tremor as well as headaches.  He was treated with doxycycline for 10 days.  This did help.  He improved during the hospitalization.  Since discharge he has not had any issues.  He continues to feel better.  He did have an MRI during his hospitalization which showed some small vessel ischemic change.  No enhancing lesions.  He denies any specific joint pain.  No confusion.  No headaches.  He did have a lumbar puncture during his hospitalization.  No specific issues noted on that tap.    The following portions of the patient's history were reviewed and updated as appropriate: allergies, current medications, past family history, past medical history, past social history, past surgical history and problem list.    Review of Systems   Neurological:  Negative for dizziness, tremors, seizures, syncope, facial asymmetry, speech difficulty, weakness, light-headedness, numbness and headaches.      I have reviewed the review of systems above  performed by my medical assistant.      Vitals:    23 0833   BP: 148/84   Pulse: 67   SpO2: 99%       Neurologic Exam     Mental Status   Oriented to person, place, and time.   Registration: recalls 3 of 3 objects. Follows 3 step commands.   Attention: normal. Concentration: normal.   Speech: speech is normal   Level of consciousness: alert  Knowledge: consistent with education (No deficits found.).   Normal comprehension.     Cranial Nerves     CN II   Visual fields full to confrontation.     CN III, IV, VI   Pupils are equal, round, and reactive to light.  Extraocular motions are normal.   CN III: no CN III palsy  CN VI: no CN VI palsy  Nystagmus: none   Diplopia: none    CN V   Facial sensation intact.     CN VII   Facial expression full, symmetric.     CN VIII   CN VIII normal.     CN IX, X   CN IX normal.   CN X normal.     CN XI   CN XI normal.     CN XII   CN XII normal.     Motor Exam   Muscle bulk: normal  Right arm tone: normal  Left arm tone: normal  Right leg tone: normal  Left leg tone: normal    Strength   Right neck flexion: 5/5  Left neck flexion: 5/5  Right neck extension: 5/5  Left neck extension: 5/5  Right deltoid: 5/5  Left deltoid: 5/5  Right biceps: 5/5  Left biceps: 5/5  Right triceps: 5/5  Left triceps: 5/5  Right wrist flexion: 5/5  Left wrist flexion: 5/5  Right wrist extension: 5/5  Left wrist extension: 5/5  Right interossei: 5/5  Left interossei: 5/5  Right abdominals: 5/5  Left abdominals: 5/5  Right iliopsoas: 5/5  Left iliopsoas: 5/5  Right quadriceps: 5/5  Left quadriceps: 5/5  Right hamstrin/5  Left hamstrin/5  Right glutei: 5/5  Left glutei: 5/5  Right anterior tibial: 5/5  Left anterior tibial: 5/5  Right posterior tibial: 5/5  Left posterior tibial: 5/5  Right peroneal: 5/5  Left peroneal: 5/5  Right gastroc: 5/5  Left gastroc: 5/5    Sensory Exam   Light touch normal.   Vibration normal.   Proprioception normal.   Pinprick normal.     Gait, Coordination, and  Reflexes     Gait  Gait: normal    Coordination   Romberg: negative    Tremor   Resting tremor: absent  Intention tremor: absent    Reflexes   Right brachioradialis: 2+  Left brachioradialis: 2+  Right biceps: 2+  Left biceps: 2+  Right triceps: 2+  Left triceps: 2+  Right patellar: 2+  Left patellar: 2+  Right achilles: 2+  Left achilles: 2+  Right : 2+  Left : 2+Station is normal.       Physical Exam  Vitals reviewed.   Constitutional:       Appearance: He is well-developed.   HENT:      Head: Normocephalic and atraumatic.   Eyes:      Extraocular Movements: EOM normal.      Pupils: Pupils are equal, round, and reactive to light.   Cardiovascular:      Rate and Rhythm: Normal rate and regular rhythm.   Pulmonary:      Breath sounds: Normal breath sounds.   Musculoskeletal:         General: Normal range of motion.   Skin:     General: Skin is warm.   Neurological:      Mental Status: He is oriented to person, place, and time.      Coordination: Romberg Test normal.      Gait: Gait is intact.      Deep Tendon Reflexes:      Reflex Scores:       Tricep reflexes are 2+ on the right side and 2+ on the left side.       Bicep reflexes are 2+ on the right side and 2+ on the left side.       Brachioradialis reflexes are 2+ on the right side and 2+ on the left side.       Patellar reflexes are 2+ on the right side and 2+ on the left side.       Achilles reflexes are 2+ on the right side and 2+ on the left side.  Psychiatric:         Speech: Speech normal.         Procedures    Assessment/Plan: At this time there is no need for further neurodiagnostic evaluation.  His Lyme's was treated and he seems to be asymptomatic at this point neurologically.  We will see him here on an as-needed basis.  A total of 30 minutes was spent face-to-face with the patient today.  Of that greater than 50% of this time was spent discussing signs and symptoms of Lyme's disease patient education, plan of care and prognosis.          Diagnoses and all orders for this visit:    1. History of Lyme disease (Primary)           Feng Antonio II, MD

## 2023-11-08 NOTE — PROGRESS NOTES
Chief Complaint   Patient presents with    Lyme Disease       Patient ID: Sanjeev Stubbs is a 58 y.o. male.    HPI: I had the pleasure of seeing your patient today.  As you may know he is a 58-year-old gentleman here for hospital follow-up status post discharge from [facility] in July.  He was seen by the neurology team at that time.  A thorough chart review was performed to gather history.  He apparently had Lyme's disease.  He was experiencing some tremor as well as headaches.  He was treated with doxycycline for 10 days.  This did help.  He improved during the hospitalization.  Since discharge he has not had any issues.  He continues to feel better.  He did have an MRI during his hospitalization which showed some small vessel ischemic change.  No enhancing lesions.  He denies any specific joint pain.  No confusion.  No headaches.  He did have a lumbar puncture during his hospitalization.  No specific issues noted on that tap.    The following portions of the patient's history were reviewed and updated as appropriate: allergies, current medications, past family history, past medical history, past social history, past surgical history and problem list.    Review of Systems   Neurological:  Negative for dizziness, tremors, seizures, syncope, facial asymmetry, speech difficulty, weakness, light-headedness, numbness and headaches.      I have reviewed the review of systems above performed by my medical assistant.      Vitals:    11/08/23 0833   BP: 148/84   Pulse: 67   SpO2: 99%       Neurologic Exam     Mental Status   Oriented to person, place, and time.   Registration: recalls 3 of 3 objects. Follows 3 step commands.   Attention: normal. Concentration: normal.   Speech: speech is normal   Level of consciousness: alert  Knowledge: consistent with education (No deficits found.).   Normal comprehension.     Cranial Nerves     CN II   Visual fields full to confrontation.     CN III, IV, VI   Pupils are equal,  round, and reactive to light.  Extraocular motions are normal.   CN III: no CN III palsy  CN VI: no CN VI palsy  Nystagmus: none   Diplopia: none    CN V   Facial sensation intact.     CN VII   Facial expression full, symmetric.     CN VIII   CN VIII normal.     CN IX, X   CN IX normal.   CN X normal.     CN XI   CN XI normal.     CN XII   CN XII normal.     Motor Exam   Muscle bulk: normal  Right arm tone: normal  Left arm tone: normal  Right leg tone: normal  Left leg tone: normal    Strength   Right neck flexion: 5/5  Left neck flexion: 5/5  Right neck extension: 5/5  Left neck extension: 5/5  Right deltoid: 5/5  Left deltoid: 5/5  Right biceps: 5/5  Left biceps: 5/5  Right triceps: 5/5  Left triceps: 5/5  Right wrist flexion: 5/5  Left wrist flexion: 5/5  Right wrist extension: 5/5  Left wrist extension: 5/5  Right interossei: 5/5  Left interossei: 5/5  Right abdominals: 5/5  Left abdominals: 5/5  Right iliopsoas: 5/5  Left iliopsoas: 5/5  Right quadriceps: 5/5  Left quadriceps: 5/5  Right hamstrin/5  Left hamstrin/5  Right glutei: 5/5  Left glutei: 5/5  Right anterior tibial: 5/5  Left anterior tibial: 5/5  Right posterior tibial: 5/5  Left posterior tibial: 5/5  Right peroneal: 5/5  Left peroneal: 5/5  Right gastroc: 5/5  Left gastroc: 5/5    Sensory Exam   Light touch normal.   Vibration normal.   Proprioception normal.   Pinprick normal.     Gait, Coordination, and Reflexes     Gait  Gait: normal    Coordination   Romberg: negative    Tremor   Resting tremor: absent  Intention tremor: absent    Reflexes   Right brachioradialis: 2+  Left brachioradialis: 2+  Right biceps: 2+  Left biceps: 2+  Right triceps: 2+  Left triceps: 2+  Right patellar: 2+  Left patellar: 2+  Right achilles: 2+  Left achilles: 2+  Right : 2+  Left : 2+Station is normal.       Physical Exam  Vitals reviewed.   Constitutional:       Appearance: He is well-developed.   HENT:      Head: Normocephalic and atraumatic.    Eyes:      Extraocular Movements: EOM normal.      Pupils: Pupils are equal, round, and reactive to light.   Cardiovascular:      Rate and Rhythm: Normal rate and regular rhythm.   Pulmonary:      Breath sounds: Normal breath sounds.   Musculoskeletal:         General: Normal range of motion.   Skin:     General: Skin is warm.   Neurological:      Mental Status: He is oriented to person, place, and time.      Coordination: Romberg Test normal.      Gait: Gait is intact.      Deep Tendon Reflexes:      Reflex Scores:       Tricep reflexes are 2+ on the right side and 2+ on the left side.       Bicep reflexes are 2+ on the right side and 2+ on the left side.       Brachioradialis reflexes are 2+ on the right side and 2+ on the left side.       Patellar reflexes are 2+ on the right side and 2+ on the left side.       Achilles reflexes are 2+ on the right side and 2+ on the left side.  Psychiatric:         Speech: Speech normal.         Procedures    Assessment/Plan: At this time there is no need for further neurodiagnostic evaluation.  His Lyme's was treated and he seems to be asymptomatic at this point neurologically.  We will see him here on an as-needed basis.  A total of 30 minutes was spent face-to-face with the patient today.  Of that greater than 50% of this time was spent discussing signs and symptoms of Lyme's disease patient education, plan of care and prognosis.         Diagnoses and all orders for this visit:    1. History of Lyme disease (Primary)           Feng Antonio II, MD

## 2023-11-14 ENCOUNTER — PATIENT ROUNDING (BHMG ONLY) (OUTPATIENT)
Dept: NEUROLOGY | Facility: CLINIC | Age: 58
End: 2023-11-14
Payer: COMMERCIAL

## 2024-03-06 DIAGNOSIS — I10 ESSENTIAL HYPERTENSION: ICD-10-CM

## 2024-03-07 RX ORDER — LISINOPRIL 20 MG/1
20 TABLET ORAL DAILY
Qty: 90 TABLET | Refills: 1 | Status: SHIPPED | OUTPATIENT
Start: 2024-03-07

## 2024-05-24 DIAGNOSIS — E78.2 MIXED HYPERLIPIDEMIA: ICD-10-CM

## 2024-05-24 DIAGNOSIS — F33.41 RECURRENT MAJOR DEPRESSIVE DISORDER, IN PARTIAL REMISSION: ICD-10-CM

## 2024-05-28 RX ORDER — ATORVASTATIN CALCIUM 20 MG/1
20 TABLET, FILM COATED ORAL DAILY
Qty: 90 TABLET | Refills: 0 | Status: SHIPPED | OUTPATIENT
Start: 2024-05-28

## 2024-05-28 RX ORDER — BUPROPION HYDROCHLORIDE 150 MG/1
150 TABLET ORAL EVERY MORNING
Qty: 90 TABLET | Refills: 0 | Status: SHIPPED | OUTPATIENT
Start: 2024-05-28

## 2024-05-28 RX ORDER — SERTRALINE HYDROCHLORIDE 100 MG/1
100 TABLET, FILM COATED ORAL DAILY
Qty: 90 TABLET | Refills: 0 | Status: SHIPPED | OUTPATIENT
Start: 2024-05-28

## 2025-03-28 ENCOUNTER — OFFICE VISIT (OUTPATIENT)
Dept: INTERNAL MEDICINE | Facility: CLINIC | Age: 60
End: 2025-03-28
Payer: COMMERCIAL

## 2025-03-28 VITALS
TEMPERATURE: 97.8 F | HEART RATE: 61 BPM | SYSTOLIC BLOOD PRESSURE: 130 MMHG | HEIGHT: 72 IN | WEIGHT: 231.9 LBS | OXYGEN SATURATION: 96 % | BODY MASS INDEX: 31.41 KG/M2 | DIASTOLIC BLOOD PRESSURE: 88 MMHG

## 2025-03-28 DIAGNOSIS — Z12.5 PROSTATE CANCER SCREENING: ICD-10-CM

## 2025-03-28 DIAGNOSIS — I10 ESSENTIAL HYPERTENSION: ICD-10-CM

## 2025-03-28 DIAGNOSIS — E66.9 NON MORBID OBESITY: ICD-10-CM

## 2025-03-28 DIAGNOSIS — E78.2 MIXED HYPERLIPIDEMIA: ICD-10-CM

## 2025-03-28 DIAGNOSIS — Z00.00 HEALTHCARE MAINTENANCE: Primary | ICD-10-CM

## 2025-03-28 DIAGNOSIS — F33.41 RECURRENT MAJOR DEPRESSIVE DISORDER, IN PARTIAL REMISSION: ICD-10-CM

## 2025-03-28 RX ORDER — BUPROPION HYDROCHLORIDE 150 MG/1
150 TABLET ORAL EVERY MORNING
Qty: 90 TABLET | Refills: 2 | Status: SHIPPED | OUTPATIENT
Start: 2025-03-28

## 2025-03-28 RX ORDER — SERTRALINE HYDROCHLORIDE 100 MG/1
100 TABLET, FILM COATED ORAL DAILY
Qty: 90 TABLET | Refills: 2 | Status: SHIPPED | OUTPATIENT
Start: 2025-03-28

## 2025-03-28 RX ORDER — ATORVASTATIN CALCIUM 20 MG/1
20 TABLET, FILM COATED ORAL DAILY
Qty: 90 TABLET | Refills: 2 | Status: SHIPPED | OUTPATIENT
Start: 2025-03-28

## 2025-03-28 RX ORDER — LISINOPRIL 20 MG/1
20 TABLET ORAL DAILY
Qty: 90 TABLET | Refills: 2 | Status: SHIPPED | OUTPATIENT
Start: 2025-03-28

## 2025-03-28 NOTE — PROGRESS NOTES
Subjective   Sanjeev Stubbs is a 59 y.o. male.     Chief Complaint   Patient presents with    Annual Exam     Off all medication    History of Present Illness   Sajneev Stubbs 59 y.o. male who presents for an Annual Wellness Visit.  he has a history of   Patient Active Problem List   Diagnosis    Essential hypertension    Depression    Non morbid obesity    Dysphagia    Neck pain    Need for Tdap vaccination    Overweight    Mixed hyperlipidemia    Screening for colon cancer    Healthcare maintenance    Prostate cancer screening    Snoring    Pain of right heel    Gastroesophageal reflux disease    Lyme disease    Elevated LFTs   .  he has been feeling fairly well.   I  reviewed health maintenance with him as part of my preventative care plan.  Has regular dental exams  The following portions of the patient's history were reviewed and updated as appropriate: allergies, current medications, past family history, past medical history, past social history, past surgical history, and problem list.    Review of Systems   Constitutional:  Negative for appetite change, fever and unexpected weight change.   HENT:  Negative for ear pain, facial swelling and sore throat.    Eyes:  Negative for pain and visual disturbance.   Respiratory:  Negative for chest tightness, shortness of breath and wheezing.    Cardiovascular:  Negative for chest pain and palpitations.   Gastrointestinal:  Negative for abdominal pain and blood in stool.   Endocrine: Negative.    Genitourinary:  Negative for decreased urine volume, difficulty urinating and hematuria.   Musculoskeletal:  Negative for joint swelling.   Neurological:  Negative for tremors, seizures and syncope.   Hematological:  Negative for adenopathy.   Psychiatric/Behavioral: Negative.  Positive for dysphoric mood.        Objective   Physical Exam  Vitals and nursing note reviewed.   Constitutional:       Appearance: Normal appearance. He is well-developed. He is obese. He is not  diaphoretic.   HENT:      Head: Normocephalic and atraumatic.      Right Ear: Tympanic membrane, ear canal and external ear normal. There is no impacted cerumen.      Left Ear: Tympanic membrane, ear canal and external ear normal.      Nose: No congestion.      Mouth/Throat:      Pharynx: No posterior oropharyngeal erythema.   Eyes:      General: Lids are normal. No scleral icterus.     Extraocular Movements: Extraocular movements intact.      Conjunctiva/sclera: Conjunctivae normal.   Neck:      Thyroid: No thyroid mass or thyromegaly.      Vascular: No carotid bruit or JVD.   Cardiovascular:      Rate and Rhythm: Normal rate and regular rhythm.      Pulses: Normal pulses.           Radial pulses are 2+ on the right side and 2+ on the left side.      Heart sounds: Normal heart sounds. No murmur heard.  Pulmonary:      Effort: Pulmonary effort is normal. No respiratory distress.      Breath sounds: Normal breath sounds.   Abdominal:      Palpations: Abdomen is soft.      Tenderness: There is no right CVA tenderness or left CVA tenderness.   Musculoskeletal:      Cervical back: Normal range of motion.      Right lower leg: No edema.      Left lower leg: No edema.   Skin:     General: Skin is warm and dry.      Coloration: Skin is not pale.      Findings: No erythema or rash.   Neurological:      General: No focal deficit present.      Mental Status: He is alert and oriented to person, place, and time.      Sensory: No sensory deficit.      Deep Tendon Reflexes: Reflexes are normal and symmetric.   Psychiatric:         Mood and Affect: Mood normal.         Behavior: Behavior normal. Behavior is cooperative.         Thought Content: Thought content normal.         Judgment: Judgment normal.         Assessment & Plan   Diagnoses and all orders for this visit:    1. Healthcare maintenance (Primary)  -     Comprehensive Metabolic Panel  -     Lipid Panel  -     TSH    2. Prostate cancer screening  -     PSA Screen    3.  Mixed hyperlipidemia  -     atorvastatin (LIPITOR) 20 MG tablet; Take 1 tablet by mouth Daily.  Dispense: 90 tablet; Refill: 2    4. Essential hypertension  -     lisinopril (PRINIVIL,ZESTRIL) 20 MG tablet; Take 1 tablet by mouth Daily.  Dispense: 90 tablet; Refill: 2    5. Non morbid obesity    6. Recurrent major depressive disorder, in partial remission  -     buPROPion XL (WELLBUTRIN XL) 150 MG 24 hr tablet; Take 1 tablet by mouth Every Morning.  Dispense: 90 tablet; Refill: 2  -     sertraline (ZOLOFT) 100 MG tablet; Take 1 tablet by mouth Daily.  Dispense: 90 tablet; Refill: 2    Attempt healthy lifestyle calorie appropriate diet and regular physical activity  Monitor for signs of cardiovascular compromise including chest pain shortness of breath increased fatigue dizziness with exertion or without  Education provided regarding prevention of serious illness immunizations recommend shingles vaccine  Education provided regarding early detection screening patient is due for colonoscopy follow-up from adenomatous polyp exam in 2023 will have repeat in 2026  PSA will be drawn today  Follow-up otherwise as needed or preventively annually

## 2025-03-28 NOTE — PROGRESS NOTES
"Chief Complaint  Annual Exam    Subjective        Sanjeev Stubbs presents to [facility]  History of Present Illness  Patient appointment to discuss hypertension hyperlipidemia overweight depression.  He would like to restart all of his medications since he mood has deteriorated and he realized that he was stable and taking both the bupropion and the sertraline we had discussion regarding initiating bupropion initially he just wants to restart all his medicines together    Objective   Vital Signs:  /88   Pulse 61   Temp 97.8 °F (36.6 °C)   Ht 182.9 cm (72\")   Wt 105 kg (231 lb 14.4 oz)   SpO2 96%   BMI 31.45 kg/m²   Estimated body mass index is 31.45 kg/m² as calculated from the following:    Height as of this encounter: 182.9 cm (72\").    Weight as of this encounter: 105 kg (231 lb 14.4 oz).          Physical Exam  Vitals and nursing note reviewed.   Constitutional:       Appearance: Normal appearance. He is well-developed. He is not diaphoretic.   HENT:      Head: Normocephalic and atraumatic.      Right Ear: Tympanic membrane, ear canal and external ear normal.      Left Ear: Tympanic membrane, ear canal and external ear normal.      Mouth/Throat:      Pharynx: No posterior oropharyngeal erythema.   Eyes:      General: Lids are normal. No scleral icterus.     Extraocular Movements: Extraocular movements intact.      Conjunctiva/sclera: Conjunctivae normal.   Neck:      Thyroid: No thyroid mass or thyromegaly.      Vascular: No carotid bruit or JVD.   Cardiovascular:      Rate and Rhythm: Normal rate and regular rhythm.      Pulses: Normal pulses.           Radial pulses are 2+ on the right side and 2+ on the left side.      Heart sounds: Normal heart sounds. No murmur heard.  Pulmonary:      Effort: Pulmonary effort is normal. No respiratory distress.      Breath sounds: Normal breath sounds.   Abdominal:      Palpations: Abdomen is soft.      Tenderness: There is no " right CVA tenderness or left CVA tenderness.   Musculoskeletal:      Cervical back: Normal range of motion.      Right lower leg: No edema.      Left lower leg: No edema.   Skin:     General: Skin is warm and dry.      Coloration: Skin is not pale.      Findings: No erythema or rash.   Neurological:      General: No focal deficit present.      Mental Status: He is alert and oriented to person, place, and time.      Sensory: No sensory deficit.      Deep Tendon Reflexes: Reflexes are normal and symmetric.   Psychiatric:         Mood and Affect: Mood normal.         Behavior: Behavior normal. Behavior is cooperative.         Thought Content: Thought content normal.         Judgment: Judgment normal.        Result Review :    July 2023 ALT 56 creatinine 0.78 potassium 3.7 March 2023 total cholesterol 183   Data reviewed : GI studies 2023 adenomatous polyp           Assessment and Plan   Diagnoses and all orders for this visit:    1. Healthcare maintenance (Primary)  -     Comprehensive Metabolic Panel  -     Lipid Panel  -     TSH    2. Prostate cancer screening  -     PSA Screen    3. Mixed hyperlipidemia  -     atorvastatin (LIPITOR) 20 MG tablet; Take 1 tablet by mouth Daily.  Dispense: 90 tablet; Refill: 2    4. Essential hypertension  -     lisinopril (PRINIVIL,ZESTRIL) 20 MG tablet; Take 1 tablet by mouth Daily.  Dispense: 90 tablet; Refill: 2    5. Non morbid obesity    6. Recurrent major depressive disorder, in partial remission  -     buPROPion XL (WELLBUTRIN XL) 150 MG 24 hr tablet; Take 1 tablet by mouth Every Morning.  Dispense: 90 tablet; Refill: 2  -     sertraline (ZOLOFT) 100 MG tablet; Take 1 tablet by mouth Daily.  Dispense: 90 tablet; Refill: 2    Restart medication as prescribed  Follow-up results of blood work  Blood pressure goal less than 140/90  Recommend cardiovascular diet         Follow Up   Return in about 3 months (around 6/28/2025), or if symptoms worsen or fail to improve, for  Recheck.  Patient was given instructions and counseling regarding his condition or for health maintenance advice. Please see specific information pulled into the AVS if appropriate.

## 2025-03-29 LAB
ALBUMIN SERPL-MCNC: 4.1 G/DL (ref 3.5–5.2)
ALBUMIN/GLOB SERPL: 2.1 G/DL
ALP SERPL-CCNC: 87 U/L (ref 39–117)
ALT SERPL-CCNC: 17 U/L (ref 1–41)
AST SERPL-CCNC: 19 U/L (ref 1–40)
BILIRUB SERPL-MCNC: 0.5 MG/DL (ref 0–1.2)
BUN SERPL-MCNC: 16 MG/DL (ref 6–20)
BUN/CREAT SERPL: 18.6 (ref 7–25)
CALCIUM SERPL-MCNC: 8.9 MG/DL (ref 8.6–10.5)
CHLORIDE SERPL-SCNC: 104 MMOL/L (ref 98–107)
CHOLEST SERPL-MCNC: 223 MG/DL (ref 0–200)
CO2 SERPL-SCNC: 28.7 MMOL/L (ref 22–29)
CREAT SERPL-MCNC: 0.86 MG/DL (ref 0.76–1.27)
EGFRCR SERPLBLD CKD-EPI 2021: 101 ML/MIN/1.73
GLOBULIN SER CALC-MCNC: 2 GM/DL
GLUCOSE SERPL-MCNC: 91 MG/DL (ref 65–99)
HDLC SERPL-MCNC: 45 MG/DL (ref 40–60)
LDLC SERPL CALC-MCNC: 152 MG/DL (ref 0–100)
POTASSIUM SERPL-SCNC: 3.9 MMOL/L (ref 3.5–5.2)
PROT SERPL-MCNC: 6.1 G/DL (ref 6–8.5)
PSA SERPL-MCNC: 0.37 NG/ML (ref 0–4)
SODIUM SERPL-SCNC: 142 MMOL/L (ref 136–145)
TRIGL SERPL-MCNC: 143 MG/DL (ref 0–150)
TSH SERPL DL<=0.005 MIU/L-ACNC: 2.46 UIU/ML (ref 0.27–4.2)
VLDLC SERPL CALC-MCNC: 26 MG/DL (ref 5–40)

## (undated) DEVICE — TRAP SPECI SUCTIONPOLYPTRAP 4/CHMBR

## (undated) DEVICE — FLEX ADVANTAGE 1500CC: Brand: FLEX ADVANTAGE

## (undated) DEVICE — KT ORCA ORCAPOD DISP STRL

## (undated) DEVICE — GOWN ,SIRUS,NONREINFORCED 3XL: Brand: MEDLINE

## (undated) DEVICE — SUREFIT, DUAL DISPERSIVE ELECTRODE, CONTACT QUALITY MONITOR: Brand: SUREFIT

## (undated) DEVICE — SINGLE-USE BIOPSY FORCEPS: Brand: RADIAL JAW 4

## (undated) DEVICE — GOWN ISOL W/THUMB UNIV BLU BX/15

## (undated) DEVICE — SYRINGE, LUER SLIP, STERILE, 60ML: Brand: MEDLINE

## (undated) DEVICE — LASSO POLYPECTOMY SNARE: Brand: LASSO

## (undated) DEVICE — Device

## (undated) DEVICE — CANN NASL CO2 TRULINK W/O2 A/

## (undated) DEVICE — VIAL FORMLN CAP 10PCT 20ML